# Patient Record
Sex: FEMALE | Race: WHITE | NOT HISPANIC OR LATINO
[De-identification: names, ages, dates, MRNs, and addresses within clinical notes are randomized per-mention and may not be internally consistent; named-entity substitution may affect disease eponyms.]

---

## 2017-08-30 ENCOUNTER — APPOINTMENT (OUTPATIENT)
Dept: ORTHOPEDIC SURGERY | Facility: CLINIC | Age: 62
End: 2017-08-30

## 2018-12-14 ENCOUNTER — OUTPATIENT (OUTPATIENT)
Dept: OUTPATIENT SERVICES | Facility: HOSPITAL | Age: 63
LOS: 1 days | End: 2018-12-14
Payer: COMMERCIAL

## 2018-12-14 ENCOUNTER — APPOINTMENT (OUTPATIENT)
Dept: CT IMAGING | Facility: CLINIC | Age: 63
End: 2018-12-14
Payer: COMMERCIAL

## 2018-12-14 DIAGNOSIS — Z00.8 ENCOUNTER FOR OTHER GENERAL EXAMINATION: ICD-10-CM

## 2018-12-14 PROCEDURE — 71260 CT THORAX DX C+: CPT | Mod: 26

## 2018-12-14 PROCEDURE — 74177 CT ABD & PELVIS W/CONTRAST: CPT | Mod: 26

## 2018-12-14 PROCEDURE — 70491 CT SOFT TISSUE NECK W/DYE: CPT | Mod: 26

## 2018-12-19 ENCOUNTER — RESULT REVIEW (OUTPATIENT)
Age: 63
End: 2018-12-19

## 2018-12-19 PROCEDURE — 88313 SPECIAL STAINS GROUP 2: CPT

## 2018-12-19 PROCEDURE — 88341 IMHCHEM/IMCYTCHM EA ADD ANTB: CPT

## 2018-12-19 PROCEDURE — 82565 ASSAY OF CREATININE: CPT

## 2018-12-19 PROCEDURE — 88342 IMHCHEM/IMCYTCHM 1ST ANTB: CPT | Mod: 26

## 2018-12-19 PROCEDURE — 88313 SPECIAL STAINS GROUP 2: CPT | Mod: 26

## 2018-12-19 PROCEDURE — 74177 CT ABD & PELVIS W/CONTRAST: CPT

## 2018-12-19 PROCEDURE — 88342 IMHCHEM/IMCYTCHM 1ST ANTB: CPT

## 2018-12-19 PROCEDURE — 88237 TISSUE CULTURE BONE MARROW: CPT

## 2018-12-19 PROCEDURE — 70491 CT SOFT TISSUE NECK W/DYE: CPT

## 2018-12-19 PROCEDURE — 88305 TISSUE EXAM BY PATHOLOGIST: CPT | Mod: 26

## 2018-12-19 PROCEDURE — 71260 CT THORAX DX C+: CPT

## 2018-12-19 PROCEDURE — 88184 FLOWCYTOMETRY/ TC 1 MARKER: CPT

## 2018-12-19 PROCEDURE — 88341 IMHCHEM/IMCYTCHM EA ADD ANTB: CPT | Mod: 26

## 2018-12-19 PROCEDURE — 88305 TISSUE EXAM BY PATHOLOGIST: CPT

## 2018-12-19 PROCEDURE — 88188 FLOWCYTOMETRY/READ 9-15: CPT | Mod: 59

## 2018-12-19 PROCEDURE — 88185 FLOWCYTOMETRY/TC ADD-ON: CPT

## 2018-12-19 PROCEDURE — 88264 CHROMOSOME ANALYSIS 20-25: CPT

## 2018-12-19 PROCEDURE — 85097 BONE MARROW INTERPRETATION: CPT

## 2018-12-19 PROCEDURE — 88280 CHROMOSOME KARYOTYPE STUDY: CPT

## 2018-12-19 PROCEDURE — 87205 SMEAR GRAM STAIN: CPT

## 2018-12-19 PROCEDURE — 88291 CYTO/MOLECULAR REPORT: CPT

## 2018-12-21 ENCOUNTER — OUTPATIENT (OUTPATIENT)
Dept: OUTPATIENT SERVICES | Facility: HOSPITAL | Age: 63
LOS: 1 days | End: 2018-12-21
Payer: COMMERCIAL

## 2018-12-21 ENCOUNTER — APPOINTMENT (OUTPATIENT)
Dept: NUCLEAR MEDICINE | Facility: CLINIC | Age: 63
End: 2018-12-21
Payer: COMMERCIAL

## 2018-12-21 DIAGNOSIS — Z00.8 ENCOUNTER FOR OTHER GENERAL EXAMINATION: ICD-10-CM

## 2018-12-21 PROCEDURE — A9552: CPT

## 2018-12-21 PROCEDURE — 78815 PET IMAGE W/CT SKULL-THIGH: CPT

## 2018-12-21 PROCEDURE — 78815 PET IMAGE W/CT SKULL-THIGH: CPT | Mod: 26,PI

## 2018-12-26 LAB
HEMATOPATHOLOGY REPORT: SIGNIFICANT CHANGE UP
TM INTERPRETATION: SIGNIFICANT CHANGE UP

## 2018-12-31 ENCOUNTER — RESULT REVIEW (OUTPATIENT)
Age: 63
End: 2018-12-31

## 2018-12-31 ENCOUNTER — OUTPATIENT (OUTPATIENT)
Dept: OUTPATIENT SERVICES | Facility: HOSPITAL | Age: 63
LOS: 1 days | End: 2018-12-31
Payer: COMMERCIAL

## 2018-12-31 DIAGNOSIS — C50.919 MALIGNANT NEOPLASM OF UNSPECIFIED SITE OF UNSPECIFIED FEMALE BREAST: ICD-10-CM

## 2018-12-31 PROCEDURE — 88321 CONSLTJ&REPRT SLD PREP ELSWR: CPT

## 2019-01-03 LAB — CHROM ANALY OVERALL INTERP SPEC-IMP: SIGNIFICANT CHANGE UP

## 2019-01-04 LAB — SURGICAL PATHOLOGY STUDY: SIGNIFICANT CHANGE UP

## 2021-03-02 ENCOUNTER — OUTPATIENT (OUTPATIENT)
Dept: OUTPATIENT SERVICES | Facility: HOSPITAL | Age: 66
LOS: 1 days | End: 2021-03-02
Payer: MEDICARE

## 2021-03-02 DIAGNOSIS — Z20.828 CONTACT WITH AND (SUSPECTED) EXPOSURE TO OTHER VIRAL COMMUNICABLE DISEASES: ICD-10-CM

## 2021-03-02 LAB — SARS-COV-2 RNA SPEC QL NAA+PROBE: SIGNIFICANT CHANGE UP

## 2021-03-02 PROCEDURE — C9803: CPT

## 2021-03-02 PROCEDURE — U0005: CPT

## 2021-03-02 PROCEDURE — U0003: CPT

## 2021-03-03 DIAGNOSIS — Z20.828 CONTACT WITH AND (SUSPECTED) EXPOSURE TO OTHER VIRAL COMMUNICABLE DISEASES: ICD-10-CM

## 2022-08-09 ENCOUNTER — APPOINTMENT (OUTPATIENT)
Dept: RHEUMATOLOGY | Facility: CLINIC | Age: 67
End: 2022-08-09

## 2022-09-08 ENCOUNTER — APPOINTMENT (OUTPATIENT)
Dept: ORTHOPEDIC SURGERY | Facility: CLINIC | Age: 67
End: 2022-09-08

## 2022-09-08 VITALS — HEIGHT: 68 IN | BODY MASS INDEX: 22.88 KG/M2 | WEIGHT: 151 LBS

## 2022-09-08 DIAGNOSIS — Z00.00 ENCOUNTER FOR GENERAL ADULT MEDICAL EXAMINATION W/OUT ABNORMAL FINDINGS: ICD-10-CM

## 2022-09-08 PROCEDURE — 99213 OFFICE O/P EST LOW 20 MIN: CPT | Mod: 57

## 2022-09-10 NOTE — HISTORY OF PRESENT ILLNESS
[5] : 5 [4] : 4 [Dull/Aching] : dull/aching [de-identified] : L worse than R CTS\par  [FreeTextEntry1] : hands

## 2022-09-10 NOTE — ASSESSMENT
[FreeTextEntry1] : For L CTR\par R/B/A of surgery discussed with the patient. Risks including but not limited to infection, nerve damage, tendon damage, pain, stiffness, recurrence, no resolution of symptoms, loss of function, limb or life. They understand and agree to surgery \par Return post op

## 2022-09-10 NOTE — PHYSICAL EXAM
[de-identified] : R hand: \par Tender volar wrist \par Good finger ROM \par +Tinels \par +Phalens \par +Compression test \par Decreased sensation median nerve distribution\par \par L hand: \par Tender volar wrist \par Good finger ROM \par +Tinels \par +Phalens \par +Compression test \par Decreased sensation median nerve distribution\par +thenar atrophy\par

## 2022-10-12 ENCOUNTER — APPOINTMENT (OUTPATIENT)
Age: 67
End: 2022-10-12

## 2022-10-12 PROCEDURE — 64721 CARPAL TUNNEL SURGERY: CPT | Mod: LT

## 2022-10-12 PROCEDURE — ZZZZZ: CPT

## 2022-10-20 ENCOUNTER — APPOINTMENT (OUTPATIENT)
Dept: PHYSICAL MEDICINE AND REHAB | Facility: CLINIC | Age: 67
End: 2022-10-20

## 2022-10-20 ENCOUNTER — APPOINTMENT (OUTPATIENT)
Dept: ORTHOPEDIC SURGERY | Facility: CLINIC | Age: 67
End: 2022-10-20

## 2022-10-20 VITALS
TEMPERATURE: 97.1 F | BODY MASS INDEX: 22.88 KG/M2 | RESPIRATION RATE: 17 BRPM | WEIGHT: 151 LBS | SYSTOLIC BLOOD PRESSURE: 128 MMHG | OXYGEN SATURATION: 97 % | HEART RATE: 82 BPM | HEIGHT: 68 IN | DIASTOLIC BLOOD PRESSURE: 82 MMHG

## 2022-10-20 VITALS — HEIGHT: 68 IN | WEIGHT: 151 LBS | BODY MASS INDEX: 22.88 KG/M2

## 2022-10-20 PROCEDURE — 99204 OFFICE O/P NEW MOD 45 MIN: CPT

## 2022-10-20 PROCEDURE — 99024 POSTOP FOLLOW-UP VISIT: CPT

## 2022-10-24 NOTE — PHYSICAL EXAM
[FreeTextEntry1] : Pleasant, in no distress. Language: English\par HEENT: Head: no trauma. Eyes: no discharge. Ears: No discharge. Nose No discharge. Throat: clear\par Neck: FAROM. Negative Spurlings\par Heart: RR, +S1, S2\par Lungs: CTA\par Abdomen: soft, NT\par Lumbar spine: FAROM, Tender over the left SI joint.\par \par LUE: Shoulder:AROM, 0-120.  Tender anterior aspect of the shoulder.  Positive impingement.  Negative drop arm.MS 4/5\par Elbow: FAROM, MS 5/5 reflexes 2/4\par Wrist: FAROM, MS 5/5 reflexes 2/4\par Warm, nontender, pulse 2+\par \par RUE:Shoulder:FAROM, MS 5/5\par Elbow: FAROM, MS 5/5 reflexes 2/4\par Wrist: FAROM, MS 5/5 reflexes 2/4\par Warm, nontender, pulse 2+\par \par LLE: Hip: FAROM, MS 5-/5 Tender over the lateral hip bursa.  Tender over the piriformis.\par Knee: FAROM, MS 5/5 reflexes 2/4\par Ankle: FAROM, MS 5/5 reflexes 2/4\par Warm , nontender, pulse 2+ negative homans\par \par RLE: Hip: FAROM, MS 5/5\par Knee: FAROM, MS 5/5 reflexes 2/4\par Ankle: FAROM, MS 5/5 reflexes 2/4\par Warm , nontender, pulse 2+ negative homans\par \par Gait: Spontaneous, reciprocal, safe without an assistive device\par \par Sensation\par RUE: sensation is intact to light touch, pinprick  and proprioception\par LUE: sensation is intact to light touch, pinprick  and proprioception\par RLE: sensation is intact to light touch, pinprick  and proprioception. Neg SLR. Neg TEJAS, Neg FADIR\par LLE: sensation is intact to light touch, pinprick  and proprioception. Neg SLR. Neg TEJAS, Neg FADIR\par \par

## 2022-10-24 NOTE — HISTORY OF PRESENT ILLNESS
[3] : 3 [1] : 2 [Dull/Aching] : dull/aching [] : Post Surgical Visit: yes [de-identified] : L CTR last week\par She is much better  [FreeTextEntry1] : left wrist  [de-identified] : 10/12/22 [de-identified] : Left CTR

## 2022-10-24 NOTE — HISTORY OF PRESENT ILLNESS
[FreeTextEntry1] : Chief complaint: Left hip and left shoulder pain.\par \par 67-year-old female developed left hip and left shoulder pain.\par \par left hip pain\par Pain:  8/10 Worse: 10/10 Quality: sharp  Frequency: constant\par The pain starts in the left buttock .  The pain radiates to the left lateral leg down to the knee.Pain is aggravated with sitting more than 1 hour.\par She tried to self treat with rest, Tylenol, Advil, use of her whirlpool.\par She went to a chiropractor who performed stretching, spine mobilization and cupping.\par \par Left shoulder pain\par She cannot recall an inciting event.\par Pain:  8/10 Worse: 10/10 Quality: sharp  Frequency: constant\par The pain starts in the left shoulder without radiation.  The pain is worse with use of her arm away from the body.  He cannot lay on the left shoulder at night.

## 2022-10-24 NOTE — PHYSICAL EXAM
[de-identified] : Mild hand swelling\par Healed incision\par No evidence of infection\par Mild tenderness at the surgical site\par Good finger ROM\par Sensation improved

## 2022-10-24 NOTE — REVIEW OF SYSTEMS
[Joint Pain] : joint pain [Joint Stiffness] : joint stiffness [Muscle Pain] : muscle pain [Fever] : no fever [Eye Pain] : no eye pain [Earache] : no earache [Chest Pain] : no chest pain [Shortness Of Breath] : no shortness of breath [Dysuria] : no dysuria [Skin Wound] : no skin wound [Dizziness] : no dizziness [Insomnia] : no insomnia [Easy Bruising] : no tendency for easy bruising

## 2022-10-28 ENCOUNTER — NON-APPOINTMENT (OUTPATIENT)
Age: 67
End: 2022-10-28

## 2022-10-28 RX ORDER — METHYLPREDNISOLONE 4 MG/1
4 TABLET ORAL
Qty: 1 | Refills: 0 | Status: ACTIVE | COMMUNITY
Start: 2022-10-28 | End: 1900-01-01

## 2022-11-04 ENCOUNTER — NON-APPOINTMENT (OUTPATIENT)
Age: 67
End: 2022-11-04

## 2022-11-07 ENCOUNTER — APPOINTMENT (OUTPATIENT)
Dept: PHYSICAL MEDICINE AND REHAB | Facility: CLINIC | Age: 67
End: 2022-11-07

## 2022-11-07 VITALS
WEIGHT: 150 LBS | RESPIRATION RATE: 17 BRPM | HEART RATE: 80 BPM | SYSTOLIC BLOOD PRESSURE: 118 MMHG | DIASTOLIC BLOOD PRESSURE: 74 MMHG | BODY MASS INDEX: 22.73 KG/M2 | TEMPERATURE: 97.4 F | HEIGHT: 68 IN | OXYGEN SATURATION: 98 %

## 2022-11-07 DIAGNOSIS — G57.02 LESION OF SCIATIC NERVE, LEFT LOWER LIMB: ICD-10-CM

## 2022-11-07 DIAGNOSIS — M25.512 PAIN IN LEFT SHOULDER: ICD-10-CM

## 2022-11-07 PROCEDURE — 20552 NJX 1/MLT TRIGGER POINT 1/2: CPT

## 2022-11-07 PROCEDURE — 99214 OFFICE O/P EST MOD 30 MIN: CPT | Mod: 25

## 2022-11-08 PROBLEM — G57.02 PIRIFORMIS SYNDROME OF LEFT SIDE: Status: ACTIVE | Noted: 2022-10-20

## 2022-11-08 PROBLEM — M25.512 LEFT SHOULDER PAIN: Status: ACTIVE | Noted: 2022-10-20

## 2022-11-08 RX ORDER — TRIAMCINOLONE ACETONIDE 40 MG/ML
40 SUSPENSION INTRA-ARTERIAL; INTRAMUSCULAR
Qty: 1 | Refills: 0 | Status: COMPLETED | OUTPATIENT
Start: 2022-11-08

## 2022-11-08 RX ORDER — LIDOCAINE HYDROCHLORIDE 20 MG/ML
2 INJECTION, SOLUTION INFILTRATION; PERINEURAL
Qty: 0 | Refills: 0 | Status: COMPLETED | OUTPATIENT
Start: 2022-11-08

## 2022-11-08 RX ADMIN — LIDOCAINE HYDROCHLORIDE 0.5 %: 20 INJECTION, SOLUTION EPIDURAL; INFILTRATION; INTRACAUDAL; PERINEURAL at 00:00

## 2022-11-08 RX ADMIN — TRIAMCINOLONE ACETONIDE 0 MG/ML: 40 SUSPENSION INTRA-ARTERIAL; INTRAMUSCULAR at 00:00

## 2022-11-08 NOTE — HISTORY OF PRESENT ILLNESS
[FreeTextEntry1] : Chief complaint: Left hip and left shoulder pain.\par \par 67-year-old female developed left hip and left shoulder pain.\par \par She has been having increased low back pain since her last office visit.\par In the interim I have placed her on a Medrol Dosepak with significant relief of her pain\par She continues to have pain and spasm in the low back that woke her from sleep.\par 3 days ago I placed her on cyclobenzaprine 5 mg 2 tabs p.o. nightly\par She also is allowed to take oxycodone 5 mg 1/2 tablets 1 tablet p.o. overnight for persistent severe pain\par Returns to the office today for persistent low back pain\par She attends restorative therapies 2 times a week with improvement of the left shoulder but not so much improvement in the left hip pain\par \par left hip pain\par Pain:  8/10 Worse: 10/10 Quality: sharp  Frequency: constant\par The pain is focused in the left upper quadrant of the buttock the pain radiates to the left lateral leg down to the knee.Pain is aggravated with sitting more than 1 hour.\par She tried to self treat with rest, Tylenol, Advil, use of her whirlpool.\par She went to a chiropractor who performed stretching, spine mobilization and cupping.\par \par Left shoulder pain\par She cannot recall an inciting event.\par Pain:  6/10 Worse: 10/10 Quality: sharp  Frequency: constant\par The pain starts in the left shoulder without radiation.  The pain is worse with use of her arm away from the body.  He cannot lay on the left shoulder at night.

## 2022-11-08 NOTE — DATA REVIEWED
[FreeTextEntry1] : X-ray of the left shoulder performed on October 21, 2022 reveals mild DJD of the AC joint\par \par X-ray of the lumbar spine performed on October 21, 2022 reveals no fracture.  He has mild degenerative spurring throughout the lumbar spine.  Grade 1 retrolisthesis at L4/5.  Disc narrowing at L2, L3/4 and L5/S1\par

## 2022-11-08 NOTE — REVIEW OF SYSTEMS
[Joint Pain] : joint pain [Joint Stiffness] : joint stiffness [Muscle Pain] : muscle pain [Fever] : no fever [Eye Pain] : no eye pain [Earache] : no earache [Chest Pain] : no chest pain [Shortness Of Breath] : no shortness of breath [Dysuria] : no dysuria [Skin Wound] : no skin wound [Dizziness] : no dizziness [Easy Bruising] : no tendency for easy bruising [Insomnia] : no insomnia

## 2022-11-08 NOTE — PHYSICAL EXAM
[FreeTextEntry1] : Pleasant, in no distress. Language: English\par HEENT: Head: no trauma. Eyes: no discharge. Ears: No discharge. Nose No discharge. Throat: clear\par Neck: FAROM. Negative Spurlings\par Heart: RR, +S1, S2\par Lungs: CTA\par Abdomen: soft, NT\par Lumbar spine: FAROM, Tender over the left SI joint.\par \par LUE: Shoulder:AROM, 0-120.  Tender anterior aspect of the shoulder.  Positive impingement.  Negative drop arm.MS 4/5\par Elbow: FAROM, MS 5/5 reflexes 2/4\par Wrist: FAROM, MS 5/5 reflexes 2/4\par Warm, nontender, pulse 2+\par \par RUE:Shoulder:FAROM, MS 5/5\par Elbow: FAROM, MS 5/5 reflexes 2/4\par Wrist: FAROM, MS 5/5 reflexes 2/4\par Warm, nontender, pulse 2+\par \par LLE: Hip: FAROM, MS 5-/5 less tender over the lateral hip bursa.  Tender over the piriformis./Upper quadrant of the gluteus medius muscle\par Knee: FAROM, MS 5/5 reflexes 2/4\par Ankle: FAROM, MS 5/5 reflexes 2/4\par Warm , nontender, pulse 2+ negative homans\par \par RLE: Hip: FAROM, MS 5/5\par Knee: FAROM, MS 5/5 reflexes 2/4\par Ankle: FAROM, MS 5/5 reflexes 2/4\par Warm , nontender, pulse 2+ negative homans\par \par Gait: Spontaneous, reciprocal, safe without an assistive device\par \par Sensation\par RUE: sensation is intact to light touch, pinprick  and proprioception\par LUE: sensation is intact to light touch, pinprick  and proprioception\par RLE: sensation is intact to light touch, pinprick  and proprioception. Neg SLR. Neg TEJAS, Neg FADIR\par LLE: sensation is intact to light touch, pinprick  and proprioception. Neg SLR. Neg TEJAS, Neg FADIR\par \par

## 2022-11-15 ENCOUNTER — RX RENEWAL (OUTPATIENT)
Age: 67
End: 2022-11-15

## 2022-11-16 RX ORDER — HYDROCODONE BITARTRATE AND ACETAMINOPHEN 5; 325 MG/1; MG/1
5-325 TABLET ORAL 4 TIMES DAILY
Qty: 28 | Refills: 0 | Status: ACTIVE | COMMUNITY
Start: 2022-11-09 | End: 1900-01-01

## 2022-11-16 RX ORDER — CYCLOBENZAPRINE HYDROCHLORIDE 5 MG/1
5 TABLET, FILM COATED ORAL 3 TIMES DAILY
Qty: 90 | Refills: 0 | Status: ACTIVE | COMMUNITY
Start: 2022-11-03 | End: 1900-01-01

## 2022-11-18 ENCOUNTER — APPOINTMENT (OUTPATIENT)
Dept: PHYSICAL MEDICINE AND REHAB | Facility: CLINIC | Age: 67
End: 2022-11-18

## 2022-11-18 VITALS — SYSTOLIC BLOOD PRESSURE: 111 MMHG | DIASTOLIC BLOOD PRESSURE: 74 MMHG | HEART RATE: 110 BPM | OXYGEN SATURATION: 100 %

## 2022-11-18 PROCEDURE — 99214 OFFICE O/P EST MOD 30 MIN: CPT

## 2022-11-18 RX ORDER — BIOTIN 10 MG
TABLET ORAL
Refills: 0 | Status: ACTIVE | COMMUNITY

## 2022-11-18 RX ORDER — HYDROXYCHLOROQUINE SULFATE 200 MG/1
200 TABLET, FILM COATED ORAL
Qty: 180 | Refills: 0 | Status: ACTIVE | COMMUNITY
Start: 2022-06-15

## 2022-11-18 RX ORDER — LORATADINE 10 MG/1
TABLET ORAL
Refills: 0 | Status: ACTIVE | COMMUNITY

## 2022-11-18 RX ORDER — NAPROXEN 500 MG/1
500 TABLET ORAL
Qty: 60 | Refills: 0 | Status: DISCONTINUED | COMMUNITY
Start: 2022-10-20 | End: 2022-11-18

## 2022-11-18 RX ORDER — HYDROCODONE BITARTRATE AND ACETAMINOPHEN 5; 325 MG/1; MG/1
5-325 TABLET ORAL EVERY 4 HOURS
Qty: 10 | Refills: 0 | Status: DISCONTINUED | COMMUNITY
Start: 2022-10-10 | End: 2022-11-18

## 2022-11-18 RX ORDER — LEFLUNOMIDE 20 MG/1
20 TABLET, FILM COATED ORAL
Qty: 90 | Refills: 0 | Status: ACTIVE | COMMUNITY
Start: 2022-08-29

## 2022-11-18 NOTE — DATA REVIEWED
[MRI] : MRI [FreeTextEntry1] : MRI lumbar spine done 11/10/22 showed evidence of left paracentral disc herniation at L3-L4 with left lateral recess and foraminal impingement, L4-L5 central disc herniation with annular tear and bilateral foraminal impingement, L5-S1 disc bulge with right S1 nerve impingment and left foraminal and left S1 impingement.

## 2022-11-18 NOTE — PHYSICAL EXAM
[Normal] : Normal gait, no clubbing or cyanosis of the fingernails, no joint swelling seen and muscle strength and tone normal [de-identified] : 4/5 left hip flexion

## 2022-11-18 NOTE — CONSULT LETTER
[Dear  ___] : Dear ~DEMARCO, [Consult Letter:] : I had the pleasure of evaluating your patient, [unfilled]. [Please see my note below.] : Please see my note below. [Consult Closing:] : Thank you very much for allowing me to participate in the care of this patient.  If you have any questions, please do not hesitate to contact me. [Sincerely,] : Sincerely, [FreeTextEntry2] : Jaziel Marie MD\par [address]\par [place] [address] [FreeTextEntry3] : [place]

## 2022-11-18 NOTE — HISTORY OF PRESENT ILLNESS
[Back] : back [___ wks] : [unfilled] week(s) ago [10] : a maximum pain level of 10/10 [Sharp] : sharp [Right] : right [Anterior] : anterior aspect of the [Feet] : feet [Numbness] : numbness [Weakness] : weakness [Laying] : laying [Walking] : walking [Insomnia] : insomnia [Gait Dysfunction] : gait dysfunction [PT] : PT [Acupuncture] : acupuncture [Medications] : medications [FreeTextEntry2] : numbness in the 3rd-5th toes of the left foot, weakness in both legs [FreeTextEntry6] : Hydrocodone, prednisone, Tylenol

## 2022-11-18 NOTE — ASSESSMENT
[FreeTextEntry1] : 67 year old female with lumbar radicular pain secondary to disc herniation.  Given the nature of the patient's complaints and lack of significant improvement following more conservative measures, we did discuss lumbar epidural steroid injection.  Risks, benefits, and expectations of the procedure were reviewed.  The patient was provided with an educational pamphlet outlining the details of the procedure so that he/she may have the ability to review the information prior to proceeding.  The patient has agreed to proceed and will follow up with me for the procedure.\par

## 2022-11-21 ENCOUNTER — OUTPATIENT (OUTPATIENT)
Dept: OUTPATIENT SERVICES | Facility: HOSPITAL | Age: 67
LOS: 1 days | End: 2022-11-21
Payer: MEDICARE

## 2022-11-21 ENCOUNTER — APPOINTMENT (OUTPATIENT)
Dept: PHYSICAL MEDICINE AND REHAB | Facility: CLINIC | Age: 67
End: 2022-11-21

## 2022-11-21 DIAGNOSIS — M54.16 RADICULOPATHY, LUMBAR REGION: ICD-10-CM

## 2022-11-21 LAB — SARS-COV-2 N GENE NPH QL NAA+PROBE: NOT DETECTED

## 2022-11-21 PROCEDURE — 62323 NJX INTERLAMINAR LMBR/SAC: CPT

## 2022-11-21 NOTE — PROCEDURE
[de-identified] : [facility] \par [facility]\par [address], [place], [address] - [phone]\par \par PATIENT: ALONZO KINNEY \par MEDICAL RECORD #:\par DATE OF OPERATION: 11/21/2022 \par PREOPERATIVE DIAGNOSIS:  LUMBAR RADICULAR PAIN\par POSTOPERATIVE DIAGNOSIS:  LUMBAR RADICULAR PAIN\par PROCEDURE: LUMBAR EPIDURAL STEROID INJECTION UNDER X-RAY GUIDANCE\par SURGEON:  BUCKY GUPTA M.D.\par ANESTHEISA:  LOCAL\par EBL:  MINIMAL\par \par INDICATIONS:  The patient returns to Pain Management with persistent lower back pain with radiation down the left leg.  The pain has remained quite significant and interferes with the patient’s ability to perform ADLs despite the implementation of other conservative measures.  I discussed with the patient at length the risks, benefits, and expectations of the aforementioned procedure.  All of the patient’s questions were answered.  The patient signed informed consent and agreed to proceed.\par \par PROCEDURE:  The patient was transported to the operating room, placed in the prone position and monitored non-invasively with stable vital signs and no complaints.  The patient’s back was prepped three times with betadine and draped in meticulous sterile fashion.  The L5-S1 interspace was identified fluoroscopically and the skin overlying this level was infiltrated with 5cc of 1% preservative-free lidocaine using a 25 gauge one inch needle.  The epidural space was approached and entered at this level with a 20 gauge Tuohy needle by loss of resistance technique.\par \par Following loss of resistance to air, aspiration was negative for CSF or heme.  Then, 2cc of Omnipaque 300 were injected and the epidurogram revealed spread from L5 to S1 in the posterior epidural space bilaterally with left-sided predominance and no distinct cutoff.  Depo-Medrol 80mg was then injected with 1cc of preservative-free 1% lidocaine.  The needle tract was flushed with 2cc of 1% lidocaine and the needle was removed.  A sterile bandage was applied.\par \par The patient tolerated the procedure well without complaint or complication.  The patient was observed in stable condition after the procedure for approximately 30 minutes before being discharged to home in the company of an adult.  The patient was provided with full written instructions.  The patient will be seen for follow-up in my office in 1 week but certainly sooner with any questions or concerns.\par \par \par \par Bucky Gupta M.D.\par \par \par

## 2022-11-22 DIAGNOSIS — M54.17 RADICULOPATHY, LUMBOSACRAL REGION: ICD-10-CM

## 2022-11-28 ENCOUNTER — RX RENEWAL (OUTPATIENT)
Age: 67
End: 2022-11-28

## 2022-11-28 RX ORDER — GABAPENTIN 100 MG/1
100 CAPSULE ORAL AT BEDTIME
Qty: 90 | Refills: 0 | Status: ACTIVE | COMMUNITY
Start: 2022-11-16 | End: 1900-01-01

## 2022-12-05 ENCOUNTER — APPOINTMENT (OUTPATIENT)
Dept: PHYSICAL MEDICINE AND REHAB | Facility: CLINIC | Age: 67
End: 2022-12-05

## 2022-12-06 ENCOUNTER — APPOINTMENT (OUTPATIENT)
Dept: PHYSICAL MEDICINE AND REHAB | Facility: CLINIC | Age: 67
End: 2022-12-06

## 2022-12-06 VITALS
SYSTOLIC BLOOD PRESSURE: 111 MMHG | OXYGEN SATURATION: 100 % | HEART RATE: 89 BPM | DIASTOLIC BLOOD PRESSURE: 76 MMHG | TEMPERATURE: 97.88 F

## 2022-12-06 PROCEDURE — 99214 OFFICE O/P EST MOD 30 MIN: CPT

## 2022-12-06 NOTE — ASSESSMENT
[FreeTextEntry1] : 67 year old female with low back and lumbar radicular pain only temporarily improved following her first LESI.  She will follow up with me next week for her procedure.

## 2022-12-06 NOTE — HISTORY OF PRESENT ILLNESS
[FreeTextEntry1] : Patient returns after the first LESI a few weeks ago.  The patient reports significant improvement in the symptoms about 100% improved but for only 1-2 days.  The patient returns for a follow up visit today.\par

## 2022-12-07 ENCOUNTER — APPOINTMENT (OUTPATIENT)
Dept: PHYSICAL MEDICINE AND REHAB | Facility: CLINIC | Age: 67
End: 2022-12-07

## 2022-12-12 ENCOUNTER — APPOINTMENT (OUTPATIENT)
Dept: PHYSICAL MEDICINE AND REHAB | Facility: CLINIC | Age: 67
End: 2022-12-12

## 2022-12-12 ENCOUNTER — OUTPATIENT (OUTPATIENT)
Dept: OUTPATIENT SERVICES | Facility: HOSPITAL | Age: 67
LOS: 1 days | End: 2022-12-12
Payer: MEDICARE

## 2022-12-12 DIAGNOSIS — M54.16 RADICULOPATHY, LUMBAR REGION: ICD-10-CM

## 2022-12-12 LAB — SARS-COV-2 N GENE NPH QL NAA+PROBE: NOT DETECTED

## 2022-12-12 PROCEDURE — 62323 NJX INTERLAMINAR LMBR/SAC: CPT

## 2022-12-12 NOTE — PROCEDURE
[de-identified] : [facility] \par [facility]\par [address], [place], [address] - [phone]\par \par PATIENT: ALONZO KINNEY \par MEDICAL RECORD #:\par DATE OF OPERATION: 12/12/2022 \par PREOPERATIVE DIAGNOSIS:  LUMBAR RADICULAR PAIN\par POSTOPERATIVE DIAGNOSIS:  LUMBAR RADICULAR PAIN\par PROCEDURE: LUMBAR EPIDURAL STEROID INJECTION UNDER X-RAY GUIDANCE\par SURGEON:  BUCKY GUPTA M.D.\par ANESTHEISA:  LOCAL\par EBL:  MINIMAL\par \par INDICATIONS:  The patient returns to Pain Management with persistent lower back pain with radiation down the left leg.  The pain has remained quite significant and interferes with the patient’s ability to perform ADLs despite the implementation of other conservative measures.  I discussed with the patient at length the risks, benefits, and expectations of the aforementioned procedure.  All of the patient’s questions were answered.  The patient signed informed consent and agreed to proceed.\par \par PROCEDURE:  The patient was transported to the operating room, placed in the prone position and monitored non-invasively with stable vital signs and no complaints.  The patient’s back was prepped three times with betadine and draped in meticulous sterile fashion.  The L5-S1 interspace was identified fluoroscopically and the skin overlying this level was infiltrated with 5cc of 1% preservative-free lidocaine using a 25 gauge one inch needle.  The epidural space was approached and entered at this level with a 20 gauge Tuohy needle by loss of resistance technique.\par \par Following loss of resistance to air, aspiration was negative for CSF or heme.  Then, 2cc of Omnipaque 300 were injected and the epidurogram revealed spread from L5 to S1 in the posterior epidural space bilaterally with left-sided predominance and no distinct cutoff.  Depo-Medrol 80mg was then injected with 1cc of preservative-free 1% lidocaine.  The needle tract was flushed with 2cc of 1% lidocaine and the needle was removed.  A sterile bandage was applied.\par \par The patient tolerated the procedure well without complaint or complication.  The patient was observed in stable condition after the procedure for approximately 30 minutes before being discharged to home in the company of an adult.  The patient was provided with full written instructions.  The patient will be seen for follow-up in my office in 1 week but certainly sooner with any questions or concerns.\par \par \par \par Bucky Gupta M.D.\par \par \par

## 2023-01-03 ENCOUNTER — NON-APPOINTMENT (OUTPATIENT)
Age: 68
End: 2023-01-03

## 2023-01-06 ENCOUNTER — APPOINTMENT (OUTPATIENT)
Dept: PHYSICAL MEDICINE AND REHAB | Facility: CLINIC | Age: 68
End: 2023-01-06

## 2023-01-09 ENCOUNTER — APPOINTMENT (OUTPATIENT)
Dept: PHYSICAL MEDICINE AND REHAB | Facility: CLINIC | Age: 68
End: 2023-01-09
Payer: MEDICARE

## 2023-01-09 ENCOUNTER — OUTPATIENT (OUTPATIENT)
Dept: OUTPATIENT SERVICES | Facility: HOSPITAL | Age: 68
LOS: 1 days | End: 2023-01-09
Payer: MEDICARE

## 2023-01-09 DIAGNOSIS — M54.16 RADICULOPATHY, LUMBAR REGION: ICD-10-CM

## 2023-01-09 PROCEDURE — 62323 NJX INTERLAMINAR LMBR/SAC: CPT

## 2023-01-09 NOTE — PROCEDURE
[de-identified] : [facility] \par PA[facility]\par [address], [place], [address] - [phone]\par \par PATIENT: ALONZO KINNEY \par MEDICAL RECORD #:\par DATE OF OPERATION: 01/09/2023 \par PREOPERATIVE DIAGNOSIS:  LUMBAR RADICULAR PAIN\par POSTOPERATIVE DIAGNOSIS:  LUMBAR RADICULAR PAIN\par PROCEDURE: LUMBAR EPIDURAL STEROID INJECTION UNDER X-RAY GUIDANCE\par SURGEON:  BUCKY GUPTA M.D.\par ANESTHEISA:  LOCAL\par EBL:  MINIMAL\par \par INDICATIONS:  The patient returns to Pain Management with persistent lower back pain with radiation down the left leg.  The pain has remained quite significant and interferes with the patient’s ability to perform ADLs despite the implementation of other conservative measures.  I discussed with the patient at length the risks, benefits, and expectations of the aforementioned procedure.  All of the patient’s questions were answered.  The patient signed informed consent and agreed to proceed.\par \par PROCEDURE:  The patient was transported to the operating room, placed in the prone position and monitored non-invasively with stable vital signs and no complaints.  The patient’s back was prepped three times with betadine and draped in meticulous sterile fashion.  The L5-S1 interspace was identified fluoroscopically and the skin overlying this level was infiltrated with 5cc of 1% preservative-free lidocaine using a 25 gauge one inch needle.  The epidural space was approached and entered at this level with a 20 gauge Tuohy needle by loss of resistance technique.\par \par Following loss of resistance to air, aspiration was negative for CSF or heme.  Then, 2cc of Omnipaque 300 were injected and the epidurogram revealed spread from L5 to S1 in the posterior epidural space bilaterally with left-sided predominance and no distinct cutoff.  Depo-Medrol 80mg was then injected with 1cc of preservative-free 1% lidocaine.  The needle tract was flushed with 2cc of 1% lidocaine and the needle was removed.  A sterile bandage was applied.\par \par The patient tolerated the procedure well without complaint or complication.  The patient was observed in stable condition after the procedure for approximately 30 minutes before being discharged to home in the company of an adult.  The patient was provided with full written instructions.  The patient will be seen for follow-up in my office in 1 week but certainly sooner with any questions or concerns.\par \par \par \par Bucky Gupta M.D.\par \par \par

## 2023-02-01 ENCOUNTER — APPOINTMENT (OUTPATIENT)
Dept: PHYSICAL MEDICINE AND REHAB | Facility: CLINIC | Age: 68
End: 2023-02-01
Payer: MEDICARE

## 2023-02-01 VITALS
DIASTOLIC BLOOD PRESSURE: 75 MMHG | TEMPERATURE: 95.4 F | OXYGEN SATURATION: 100 % | SYSTOLIC BLOOD PRESSURE: 115 MMHG | HEART RATE: 82 BPM

## 2023-02-01 DIAGNOSIS — M51.36 OTHER INTERVERTEBRAL DISC DEGENERATION, LUMBAR REGION: ICD-10-CM

## 2023-02-01 DIAGNOSIS — M54.50 LOW BACK PAIN, UNSPECIFIED: ICD-10-CM

## 2023-02-01 PROCEDURE — 99213 OFFICE O/P EST LOW 20 MIN: CPT

## 2023-02-02 ENCOUNTER — NON-APPOINTMENT (OUTPATIENT)
Age: 68
End: 2023-02-02

## 2023-02-13 ENCOUNTER — NON-APPOINTMENT (OUTPATIENT)
Age: 68
End: 2023-02-13

## 2023-02-13 ENCOUNTER — APPOINTMENT (OUTPATIENT)
Dept: NEUROSURGERY | Facility: CLINIC | Age: 68
End: 2023-02-13
Payer: MEDICARE

## 2023-02-13 DIAGNOSIS — M51.26 OTHER INTERVERTEBRAL DISC DISPLACEMENT, LUMBAR REGION: ICD-10-CM

## 2023-02-13 DIAGNOSIS — Z85.79 PERSONAL HISTORY OF OTHER MALIGNANT NEOPLASMS OF LYMPHOID, HEMATOPOIETIC AND RELATED TISSUES: ICD-10-CM

## 2023-02-13 DIAGNOSIS — R20.0 ANESTHESIA OF SKIN: ICD-10-CM

## 2023-02-13 DIAGNOSIS — R29.898 OTHER SYMPTOMS AND SIGNS INVOLVING THE MUSCULOSKELETAL SYSTEM: ICD-10-CM

## 2023-02-13 DIAGNOSIS — Z87.39 PERSONAL HISTORY OF OTHER DISEASES OF THE MUSCULOSKELETAL SYSTEM AND CONNECTIVE TISSUE: ICD-10-CM

## 2023-02-13 PROCEDURE — 99203 OFFICE O/P NEW LOW 30 MIN: CPT

## 2023-02-13 RX ORDER — SULFASALAZINE 500 MG/1
500 TABLET ORAL
Refills: 0 | Status: ACTIVE | COMMUNITY

## 2023-02-14 NOTE — HISTORY OF PRESENT ILLNESS
[FreeTextEntry1] : Lowerback pain into left leg [de-identified] : Ms. ALONZO KINNEY is a 67 year presenting with a PMHx of RA 1 year   who presents for comprehensive neurosurgical evaluation of Lumbar Spine.\par She reports Lowerback pain with Left leg initially at night. The pain was interfering with her quality of life. PT  and gabapentin did not help. She was referred to see Dr. Gupta.\par She had JORGE 11/9/22 with relief for one day. She underwent a  series of JORGE injections.  Her second JORGE 2 helped for one wee and her third  JORGE on 1/9/23 change her pain syndrome and weakness in Left foot. She reports that [place] way while on vacation she began to experience a new kind of pressure behind her knee and painful tingling in her lower leg and foot.  She is concerned about the progression of her symptoms after receiving JORGE injections and is her for evaluation. SHe ambulates with slight limp.\par \par \par

## 2023-02-14 NOTE — ASSESSMENT
[FreeTextEntry1] : 67 year old female with low back and lumbar radicular pain.  We will give her a prescription for PT and she will continue with acupuncture.  She will also be consulting with Dr. Ruiz to discuss surgical options.

## 2023-02-14 NOTE — PHYSICAL EXAM
[General Appearance - Alert] : alert [General Appearance - In No Acute Distress] : in no acute distress [Oriented To Time, Place, And Person] : oriented to person, place, and time [No Visual Abnormalities] : no visible abnormailities [Straight-Leg Raise Test - Left] : straight leg raise of the left leg was negative [Straight-Leg Raise Test - Right] : straight leg raise  of the right leg was negative [Normal] : normal [Able to toe walk] : the patient was able to toe walk [Able to heel walk] : the patient was not able to heel walk [Outer Ear] : the ears and nose were normal in appearance [Neck Appearance] : the appearance of the neck was normal [Heart Rate And Rhythm] : heart rate was normal and rhythm regular [Abnormal Walk] : normal gait

## 2023-02-14 NOTE — HISTORY OF PRESENT ILLNESS
[FreeTextEntry1] : Patient returns after her third LESI.  Unfortunately, she does continue to have pain radiating down her left lateral calf and into her foot/toes.  She also has weakness in her left leg.  She has also been going to acupuncture which seems to be helping.  She is also considering surgical consultation.

## 2023-02-14 NOTE — REVIEW OF SYSTEMS
[Feeling Poorly] : feeling poorly [Feeling Tired] : not feeling tired [Leg Weakness] : leg weakness [Poor Coordination] : good coordination [Numbness] : numbness [Tingling] : tingling [Abnormal Sensation] : an abnormal sensation [Difficulty Walking] : difficulty walking [Arthralgias] : arthralgias [Joint Pain] : joint pain [Limb Pain] : limb pain [Negative] : Gastrointestinal

## 2023-02-14 NOTE — ASSESSMENT
[FreeTextEntry1] : Ms. ALONZO KINNEY is presenting with L5 Nerve Irritation/ Radiculopathy\par Dr. Ruiz explained in great detail a diagnosis of L5 Nerve Root irritation\par The recommendation is for the following:\par Begin Physical Therapy to strengthen Left Leg and foot weakness\par New MRI Lumbar Spine w/wo contrast to assess for L5 disc herniation\par Follow up after MRI is completed.\par \par \par Please see Dr. Ruiz's dictation for details.\par I, Dr. Jessica Ruiz evaluated the patient with the nurse practitioner Michele Barry and established the plan of care. I personally discuss this patient with the nurse practitioner at the time of the visit. I agree with the assessment and plan as written, unless noted below.\par \par \par

## 2023-02-23 ENCOUNTER — APPOINTMENT (OUTPATIENT)
Dept: MRI IMAGING | Facility: CLINIC | Age: 68
End: 2023-02-23
Payer: MEDICARE

## 2023-02-23 PROCEDURE — A9585: CPT

## 2023-02-23 PROCEDURE — 72158 MRI LUMBAR SPINE W/O & W/DYE: CPT

## 2023-02-28 ENCOUNTER — NON-APPOINTMENT (OUTPATIENT)
Age: 68
End: 2023-02-28

## 2023-03-06 ENCOUNTER — APPOINTMENT (OUTPATIENT)
Dept: NEUROSURGERY | Facility: CLINIC | Age: 68
End: 2023-03-06
Payer: MEDICARE

## 2023-03-06 DIAGNOSIS — M51.26 OTHER INTERVERTEBRAL DISC DISPLACEMENT, LUMBAR REGION: ICD-10-CM

## 2023-03-06 PROCEDURE — 99211 OFF/OP EST MAY X REQ PHY/QHP: CPT

## 2023-03-07 PROBLEM — M51.26 HERNIATED LUMBAR INTERVERTEBRAL DISC: Status: ACTIVE | Noted: 2023-02-13

## 2023-03-07 NOTE — ASSESSMENT
[FreeTextEntry1] : Ms. ALONZO KINNEY is presenting with Lumbar Radiculopathy\par Dr. Ruiz explained in great detail a diagnosis of Lumbar Radiculopathy\par The recommendation is for the following:\par The following explanation per Dr. Ruiz\par The L3-L4 is resolved. L5-S1 level remains unchanged from prior MRI. The herniated disc is more lateral. The majority of her symptoms is from the S1 lateral disc herniation. She may or may not benefit from a Transforaminal L5-S1 block. She will follow up with Dr. Ruiz to discuss further.\par \par Please see Dr. Ruiz's dictation for details.\par I, Dr. Jessica Ruiz evaluated the patient with the nurse practitioner Michele Barry and established the plan of care. I personally discuss this patient with the nurse practitioner at the time of the visit. I agree with the assessment and plan as written, unless noted below.\par \par

## 2023-03-07 NOTE — REASON FOR VISIT
[Follow-Up: _____] : a [unfilled] follow-up visit [FreeTextEntry1] : Today she comes with new MRI for review\par Today she reports the following:\par She reports that her symptoms are unchanged

## 2023-03-07 NOTE — DATA REVIEWED
[de-identified] : LUMBAR SPINE 2/23/23 IMPRESSION:\par 1. Multilevel degenerative disc disease is again seen with resolution of the prior inferior left paracentral disc extrusion at L3-L4.\par 2. L3-L4 demonstrates mild spinal canal stenosis with moderate-to-severe narrowing of the right lateral recess and moderate narrowing of the left lateral recess.\par 3. L4-L5 demonstrates mild to moderate spinal canal stenosis and mild bilateral neural foraminal stenosis.\par 4. L5-S1 left sided paravertebral osteophytes abut the extraforaminal left L5 nerve roots.\par

## 2023-03-07 NOTE — PHYSICAL EXAM
[General Appearance - Alert] : alert [General Appearance - In No Acute Distress] : in no acute distress [Oriented To Time, Place, And Person] : oriented to person, place, and time [] : no respiratory distress [Heart Rate And Rhythm] : heart rate was normal and rhythm regular [Abnormal Walk] : normal gait [Skin Color & Pigmentation] : normal skin color and pigmentation

## 2023-03-15 ENCOUNTER — APPOINTMENT (OUTPATIENT)
Dept: NEUROSURGERY | Facility: CLINIC | Age: 68
End: 2023-03-15

## 2023-05-26 ENCOUNTER — NON-APPOINTMENT (OUTPATIENT)
Age: 68
End: 2023-05-26

## 2023-06-28 ENCOUNTER — NON-APPOINTMENT (OUTPATIENT)
Age: 68
End: 2023-06-28

## 2023-07-10 ENCOUNTER — APPOINTMENT (OUTPATIENT)
Dept: ORTHOPEDIC SURGERY | Facility: CLINIC | Age: 68
End: 2023-07-10
Payer: MEDICARE

## 2023-07-10 VITALS — HEIGHT: 68 IN | WEIGHT: 150 LBS | BODY MASS INDEX: 22.73 KG/M2

## 2023-07-10 PROCEDURE — 99213 OFFICE O/P EST LOW 20 MIN: CPT

## 2023-07-10 NOTE — PHYSICAL EXAM
[de-identified] : R hand: \par Tender volar wrist \par Good finger ROM \par +Tinels \par +Phalens \par +Compression test \par Decreased sensation median nerve distribution\par

## 2023-07-10 NOTE — HISTORY OF PRESENT ILLNESS
[3] : 3 [Dull/Aching] : dull/aching [Radiating] : radiating [Tingling] : tingling [Intermittent] : intermittent [Meds] : meds [Full time] : Work status: full time [de-identified] : She has tightness in the R hand \par Pain at the dorsal hand at night \par \par She takes steroids which help\par \par Had L CTR [] : Post Surgical Visit: no [FreeTextEntry1] : Rt hand [FreeTextEntry5] : Patient is here with pain in her right hand, complains of tightness and numbness in the wrist for more than a year, has been seeing a rheumatologist in the past. had same issue with the left hand last year. [FreeTextEntry6] : numbness [de-identified] : n

## 2023-07-10 NOTE — ASSESSMENT
[FreeTextEntry1] : I rec CT injection- she declines\par Will get EMG\par Return after EMG- consider CTR

## 2023-07-11 ENCOUNTER — APPOINTMENT (OUTPATIENT)
Dept: NEUROLOGY | Facility: CLINIC | Age: 68
End: 2023-07-11
Payer: MEDICARE

## 2023-07-11 DIAGNOSIS — M54.12 RADICULOPATHY, CERVICAL REGION: ICD-10-CM

## 2023-07-11 DIAGNOSIS — R20.2 ANESTHESIA OF SKIN: ICD-10-CM

## 2023-07-11 DIAGNOSIS — R20.0 ANESTHESIA OF SKIN: ICD-10-CM

## 2023-07-11 PROCEDURE — 95886 MUSC TEST DONE W/N TEST COMP: CPT

## 2023-07-11 PROCEDURE — 95911 NRV CNDJ TEST 9-10 STUDIES: CPT

## 2023-07-13 ENCOUNTER — APPOINTMENT (OUTPATIENT)
Dept: ORTHOPEDIC SURGERY | Facility: CLINIC | Age: 68
End: 2023-07-13
Payer: MEDICARE

## 2023-07-13 VITALS — BODY MASS INDEX: 22.73 KG/M2 | HEIGHT: 68 IN | WEIGHT: 150 LBS

## 2023-07-13 PROCEDURE — 99213 OFFICE O/P EST LOW 20 MIN: CPT | Mod: 57

## 2023-07-13 NOTE — PHYSICAL EXAM
[de-identified] : R hand: \par Tender volar wrist \par Good finger ROM \par +Tinels \par +Phalens \par +Compression test \par Decreased sensation median nerve distribution\par

## 2023-07-13 NOTE — HISTORY OF PRESENT ILLNESS
[6] : 6 [Tingling] : tingling [de-identified] : EMG shows mod R CTR; cerv radic\par  [FreeTextEntry1] : R wrist [de-identified] : brace

## 2023-07-13 NOTE — ASSESSMENT
[FreeTextEntry1] : R CTR\par R/B/A of surgery discussed with the patient. Risks including but not limited to infection, nerve damage, tendon damage, pain, stiffness, recurrence, no resolution of symptoms, loss of function, limb or life. They understand and agree to surgery \par Return post op

## 2023-07-25 ENCOUNTER — APPOINTMENT (OUTPATIENT)
Dept: NEUROLOGY | Facility: CLINIC | Age: 68
End: 2023-07-25
Payer: MEDICARE

## 2023-07-25 VITALS
BODY MASS INDEX: 20.23 KG/M2 | TEMPERATURE: 98 F | HEIGHT: 68 IN | SYSTOLIC BLOOD PRESSURE: 116 MMHG | DIASTOLIC BLOOD PRESSURE: 71 MMHG | OXYGEN SATURATION: 100 % | RESPIRATION RATE: 15 BRPM | WEIGHT: 133.5 LBS | HEART RATE: 72 BPM

## 2023-07-25 DIAGNOSIS — R20.2 ANESTHESIA OF SKIN: ICD-10-CM

## 2023-07-25 DIAGNOSIS — R20.0 ANESTHESIA OF SKIN: ICD-10-CM

## 2023-07-25 PROCEDURE — 99204 OFFICE O/P NEW MOD 45 MIN: CPT

## 2023-07-27 LAB
ESTIMATED AVERAGE GLUCOSE: 100 MG/DL
FOLATE SERPL-MCNC: 14.3 NG/ML
HBA1C MFR BLD HPLC: 5.1 %
HCYS SERPL-MCNC: 10.5 UMOL/L
PCA AB SER QL IF: NORMAL
VIT B12 SERPL-MCNC: 402 PG/ML

## 2023-07-31 LAB
IF BLOCK AB SER QL: 1.2 AU/ML
METHYLMALONATE SERPL-SCNC: 224 NMOL/L

## 2023-07-31 RX ORDER — HYDROCODONE BITARTRATE AND ACETAMINOPHEN 5; 325 MG/1; MG/1
5-325 TABLET ORAL EVERY 4 HOURS
Qty: 10 | Refills: 0 | Status: ACTIVE | COMMUNITY
Start: 2023-07-31 | End: 1900-01-01

## 2023-08-02 ENCOUNTER — APPOINTMENT (OUTPATIENT)
Age: 68
End: 2023-08-02
Payer: MEDICARE

## 2023-08-02 PROCEDURE — 64721 CARPAL TUNNEL SURGERY: CPT | Mod: RT

## 2023-08-02 PROCEDURE — ZZZZZ: CPT

## 2023-08-04 RX ORDER — HYDROCODONE BITARTRATE AND ACETAMINOPHEN 5; 325 MG/1; MG/1
5-325 TABLET ORAL
Qty: 10 | Refills: 0 | Status: ACTIVE | COMMUNITY
Start: 2023-08-04 | End: 1900-01-01

## 2023-08-07 LAB — VIT B6 SERPL-MCNC: 19.9 UG/L

## 2023-08-08 NOTE — ASSESSMENT
[FreeTextEntry1] : patient  presents today with complaints of b/l   numbness and tingling to b/l E that has been more persistent past 6 months. initally noted after braking her left 5th toe 2021, gradually progressed to right foot.

## 2023-08-08 NOTE — HISTORY OF PRESENT ILLNESS
[FreeTextEntry1] : 68 year old female with PMhx of NHL 2019 s/p Rituxan , RA , Chronic LBP   Presents today for numbness and tingling in feet that started  s/p braking her  her left pinky toe foot in 3/2021.   9 2022 developed same numbness on her right foot, foot has been bothersome  past 6 months..  described and burning sensation, numbness. worse at rest and at night.  feels its in a vice   Saw Dr. Ruiz, MRI reported multilevel  degenerative jose disease, mild-mod stenosis, L5-S1 left sided paravertebral osteophytes abut the extraforaminal left L5 nerve roots - referred to PT; completed with  improvement of  radicular sx,   also LBP in September.  completed PT 11/2022-1/23 and epidurals She has been on Gabapentin and steroids since    hand tingling  started 12/21 saw amena hand, Received steroids injections with> > RA   5/2023- had rituacxin again for hand pains with no improvement  6/2023- told not to have RA  orhto hand told to have CTS 8/2022, s/p repair

## 2023-08-08 NOTE — PHYSICAL EXAM
[Affect] : the affect was normal [Mood] : the mood was normal [Person] : oriented to person [Place] : oriented to place [Time] : oriented to time [Current Events] : adequate knowledge of current events [Cranial Nerves Optic (II)] : visual acuity intact bilaterally,  visual fields full to confrontation, pupils equal round and reactive to light [Cranial Nerves Oculomotor (III)] : extraocular motion intact [Cranial Nerves Trigeminal (V)] : facial sensation intact symmetrically [Cranial Nerves Facial (VII)] : face symmetrical [Cranial Nerves Vestibulocochlear (VIII)] : hearing was intact bilaterally [Cranial Nerves Glossopharyngeal (IX)] : tongue and palate midline [Cranial Nerves Accessory (XI - Cranial And Spinal)] : head turning and shoulder shrug symmetric [Cranial Nerves Hypoglossal (XII)] : there was no tongue deviation with protrusion [Motor Strength] : muscle strength was normal in all four extremities [Motor Handedness Right-Handed] : the patient is right hand dominant [Proprioception] : proprioception was intact [Vibration Decrease Leg / Foot Toes Both Feet] : decreased at the toes of both feet  [Abnormal Walk] : normal gait [2+] : Patella left 2+ [1+] : Ankle jerk left 1+ [PERRL With Normal Accommodation] : pupils were equal in size, round, reactive to light, with normal accommodation [Extraocular Movements] : extraocular movements were intact [] : no respiratory distress [Edema] : there was no peripheral edema [General Appearance - In No Acute Distress] : in no acute distress [FreeTextEntry8] : minimal sway  [No Spinal Tenderness] : no spinal tenderness [Involuntary Movements] : no involuntary movements were seen

## 2023-08-10 ENCOUNTER — APPOINTMENT (OUTPATIENT)
Dept: ORTHOPEDIC SURGERY | Facility: CLINIC | Age: 68
End: 2023-08-10
Payer: MEDICARE

## 2023-08-10 VITALS — WEIGHT: 133 LBS | HEIGHT: 68 IN | BODY MASS INDEX: 20.16 KG/M2

## 2023-08-10 PROCEDURE — 99024 POSTOP FOLLOW-UP VISIT: CPT

## 2023-08-10 NOTE — PHYSICAL EXAM
[de-identified] : Mild hand swelling Healed incision No evidence of infection Mild tenderness at the surgical site Good finger ROM Sensaiton improved

## 2023-08-10 NOTE — HISTORY OF PRESENT ILLNESS
[1] : 2 [0] : 0 [Dull/Aching] : dull/aching [] : Post Surgical Visit: yes [de-identified] : R CTR last week She is feeling better  L hand swelling, pain from IV [FreeTextEntry1] : R wrist [de-identified] : 8/2/23 [de-identified] : R LEYLA

## 2023-08-10 NOTE — ASSESSMENT
[FreeTextEntry1] : Sutures removed Steris applied LIght activities and advance as tolerated Return prn

## 2023-08-17 ENCOUNTER — APPOINTMENT (OUTPATIENT)
Dept: ORTHOPEDIC SURGERY | Facility: CLINIC | Age: 68
End: 2023-08-17
Payer: MEDICARE

## 2023-08-17 VITALS — HEIGHT: 68 IN | WEIGHT: 133 LBS | BODY MASS INDEX: 20.16 KG/M2

## 2023-08-17 PROCEDURE — 73110 X-RAY EXAM OF WRIST: CPT | Mod: 50

## 2023-08-17 PROCEDURE — 99024 POSTOP FOLLOW-UP VISIT: CPT

## 2023-08-17 NOTE — HISTORY OF PRESENT ILLNESS
[6] : 6 [Dull/Aching] : dull/aching [] : Post Surgical Visit: yes [de-identified] : L wrist swelling Pain  Had R CTR- better  [FreeTextEntry1] : wrists [de-identified] : ice [de-identified] : 8/2/23 [de-identified] : R LEYLA

## 2023-08-17 NOTE — PHYSICAL EXAM
[de-identified] : Bilateral vilar distal forearm swelling Stiffness +Iproved sensaiton   Xrays OA

## 2023-08-23 ENCOUNTER — APPOINTMENT (OUTPATIENT)
Dept: NEUROLOGY | Facility: CLINIC | Age: 68
End: 2023-08-23
Payer: MEDICARE

## 2023-08-23 VITALS
SYSTOLIC BLOOD PRESSURE: 113 MMHG | HEART RATE: 66 BPM | HEIGHT: 68 IN | WEIGHT: 135 LBS | BODY MASS INDEX: 20.46 KG/M2 | DIASTOLIC BLOOD PRESSURE: 67 MMHG

## 2023-08-23 DIAGNOSIS — R20.2 ANESTHESIA OF SKIN: ICD-10-CM

## 2023-08-23 DIAGNOSIS — R20.0 ANESTHESIA OF SKIN: ICD-10-CM

## 2023-08-23 PROCEDURE — 99204 OFFICE O/P NEW MOD 45 MIN: CPT

## 2023-08-23 NOTE — CONSULT LETTER
[Dear  ___] : Dear  [unfilled], [Consult Letter:] : I had the pleasure of evaluating your patient, [unfilled]. [Please see my note below.] : Please see my note below. [Consult Closing:] : Thank you very much for allowing me to participate in the care of this patient.  If you have any questions, please do not hesitate to contact me. [Sincerely,] : Sincerely, [DrCaroline  ___] : Dr. HARTLEY [FreeTextEntry3] : Ankur Mejia MD

## 2023-08-23 NOTE — HISTORY OF PRESENT ILLNESS
[FreeTextEntry1] : Mrs. Fiordaliza Marte is a 68-year-old right-handed patient who was referred for neurologic evaluation by nurse practitioner Sharon Davis.  Mrs. Marte was diagnosed with follicular non-Hodgkin's lymphoma in 2018 involving her neck, groin and axillas.  In March 2019, she was treated with rituximab.  She is under the care of Dr. Geremias Caballero at [facility].  In November 2021, she experienced pins-and-needles and cramping of her right hand.  This was particularly prominent at night.  She noticed numbness of her first 3 fingers.  A few months later, her left hand became involved but more severely.  Her hands felt weak.  She was evaluated by a rheumatologist Dr. Donny Salas in [place].  She received another treatment of rituximab in March 2023 because of suspicion of rheumatoid arthritis.  Her symptoms persisted,  She underwent a left carpal tunnel release in October 2022 with relief of hand symptoms.  2 weeks ago, she underwent a right carpal tunnel release with improvement.  In November 2022, she experienced left buttock and low back pain.  She was treated with 3 epidural steroid injections.  After the last treatment, she experienced tingling of her left lateral calf and foot.  She subsequently noted tingling of the distal feet bilaterally.  She had a prior fracture of her left fifth toe which she thought might be contributing.  She reports a mild left foot drop.  She denies sphincteric difficulties or neck pain.  MRI of the lumbar spine performed in February 2023 revealed multilevel degenerative disc disease.  There was resolution of a prior left inferior paracentral disc extrusion at L3-4 compared to November 2022.  At L3-4, there was mild spinal stenosis with mild to moderately severe narrowing of the right lateral recess and moderate narrowing of the left lateral recess.  At L4-5, there was mild to moderate spinal canal stenosis and mild bilateral neuroforaminal stenosis.  At L5-S1, there was left-sided paravertebral osteophytes abutting the extraforaminal left L5 nerve root.  She underwent a recent left axillary node biopsy which revealed abnormal B-cell population and 35.2%  Past surgical history is notable for bilateral carpal tunnel releases and tonsillectomy.  She suffers from follicular non-Hodgkin's lymphoma.  There is no history of hypertension, diabetes, hyperlipidemia, cardiac, pulmonary, renal, hepatic, gastrointestinal, thyroid or cerebrovascular disease.  She has no allergies.  Her only medication is gabapentin 100 mg 3 times a day.  She is a former smoker and social drinker.  She is  and works as an .  She is .  Family history is notable for mother with diabetes, breast cancer, congestive heart failure and chronic renal insufficiency.  Her father suffered from non-Hodgkin's lymphoma and cancer of the colon.  Her sister and 2 of her sister's children suffer from inflammatory bowel disease.  Her sister's son suffered from breast cancer.

## 2023-08-23 NOTE — PHYSICAL EXAM
[FreeTextEntry1] : Constitutional:  Patient was well-developed, well-nourished and in no acute distress.   Head:  Normocephalic, atraumatic. Tympanic membranes were clear.   Neck:  Supple with full range of motion.   Cardiovascular:  Cardiac rhythm was regular without murmur. There were no carotid bruits. Peripheral pulses were full and symmetric.   Respiratory:  Lungs were clear.   Abdomen:  Soft and nontender.   Spine:  Nontender.   Skin:  There were no rashes.   NEUROLOGICAL EXAMINATION:  Mental Status: Patient was alert and oriented. Speech was fluent. There was no dysarthria.   Cranial Nerves:   II: She could finger count bilaterally. Pupils were equal and reactive. Visual fields were full. Funduscopic examination was normal.   III, IV, VI:  Eye movements were full without nystagmus.   V: Facial sensation was intact.   VII: Facial strength was normal.   VIII: Hearing was equal.   IX, X: Palatal movement was normal. Phonation was normal.   XI: Sternocleidomastoids and trapezii were normal.   XII: Tongue was midline and movements normal. There was no lingual atrophy or fasciculations.   Motor Examination: Muscle bulk, tone and strength were normal.  There was no weakness of the intrinsic hand muscles.  She was able to stand on her toes well.  She had mild difficulty standing on her left heel.  Sensory Examination: Vibration was diminished in the feet.  There was shading of pinprick in a stocking distribution.  There was no sensory deficit in the hands.  Joint position sense was intact.  There were no Tinel signs at the wrists or elbows.  Reflexes: DTRs were 2+ throughout.   Plantar Responses: Plantar responses were flexor.   Coordination/Cerebellar Function: There was no dysmetria on finger to nose or heel to shin testing.   Gait/Stance: Gait and tandem were normal. Romberg was negative.

## 2023-08-23 NOTE — ASSESSMENT
[FreeTextEntry1] : Mrs. Marte is a 68-year-old with a long history of follicular non-Hodgkin's lymphoma treated with rituximab.  More recently, she has developed symptoms consistent with median entrapments at the wrists which appear to have responded to carpal tunnel release.  In addition, she complains of left buttock and low back pain and bilateral distal lower extremity sensory symptoms.  I suspect that those symptoms represent a combination of lumbar radiculopathy and possibly a sensory polyneuropathy.  I suggested that she undergo a comprehensive serologic evaluation including immunoglobulin studies, MAG and TTR.  She is scheduled for EMG and nerve conduction studies to be performed by Dr. Flores later this week.  I look forward to reviewing his study.  Further management will depend upon those results and his clinical course.

## 2023-08-25 ENCOUNTER — LABORATORY RESULT (OUTPATIENT)
Age: 68
End: 2023-08-25

## 2023-08-25 ENCOUNTER — APPOINTMENT (OUTPATIENT)
Dept: NEUROLOGY | Facility: CLINIC | Age: 68
End: 2023-08-25
Payer: MEDICARE

## 2023-08-25 ENCOUNTER — TRANSCRIPTION ENCOUNTER (OUTPATIENT)
Age: 68
End: 2023-08-25

## 2023-08-25 DIAGNOSIS — M54.16 RADICULOPATHY, LUMBAR REGION: ICD-10-CM

## 2023-08-25 PROCEDURE — 95886 MUSC TEST DONE W/N TEST COMP: CPT | Mod: 59

## 2023-08-25 PROCEDURE — 95912 NRV CNDJ TEST 11-12 STUDIES: CPT

## 2023-08-26 ENCOUNTER — NON-APPOINTMENT (OUTPATIENT)
Age: 68
End: 2023-08-26

## 2023-08-26 LAB
CK SERPL-CCNC: 74 U/L
CRP SERPL-MCNC: 13 MG/L
ERYTHROCYTE [SEDIMENTATION RATE] IN BLOOD BY WESTERGREN METHOD: 9 MM/HR
HAV IGM SER QL: NONREACTIVE
HBV CORE IGM SER QL: NONREACTIVE
HBV SURFACE AG SER QL: NONREACTIVE
HCV AB SER QL: NONREACTIVE
HCV S/CO RATIO: 0.07 S/CO
RHEUMATOID FACT SER QL: <10 IU/ML
T PALLIDUM AB SER QL IA: NEGATIVE
TSH SERPL-ACNC: 0.5 UIU/ML

## 2023-08-27 LAB — CELIACPAN: NORMAL

## 2023-08-28 LAB
ALBUMIN MFR SERPL ELPH: 61.5 %
ALBUMIN SERPL-MCNC: 3.8 G/DL
ALBUMIN/GLOB SERPL: 1.7 RATIO
ALPHA1 GLOB MFR SERPL ELPH: 5.5 %
ALPHA1 GLOB SERPL ELPH-MCNC: 0.3 G/DL
ALPHA2 GLOB MFR SERPL ELPH: 10.6 %
ALPHA2 GLOB SERPL ELPH-MCNC: 0.6 G/DL
ANACR T: NEGATIVE
B-GLOBULIN MFR SERPL ELPH: 11.8 %
B-GLOBULIN SERPL ELPH-MCNC: 0.7 G/DL
DEPRECATED KAPPA LC FREE/LAMBDA SER: 1.04 RATIO
GAMMA GLOB FLD ELPH-MCNC: 0.6 G/DL
GAMMA GLOB MFR SERPL ELPH: 10.6 %
IGA SER QL IEP: 150 MG/DL
IGG SER QL IEP: 693 MG/DL
IGM SER QL IEP: 23 MG/DL
INTERPRETATION SERPL IEP-IMP: NORMAL
KAPPA LC CSF-MCNC: 1.71 MG/DL
KAPPA LC SERPL-MCNC: 1.78 MG/DL
M PROTEIN SPEC IFE-MCNC: NORMAL
PROT SERPL-MCNC: 6.1 G/DL
PROT SERPL-MCNC: 6.1 G/DL

## 2023-08-29 LAB
ASIALO-GM1 ANTIBODIES, IGG/IGM: 8 IV
GD1A ANTIBODIES, IGG/IGM: NORMAL IV
GD1B ANTIBODIES, IGG/IGM: 5 IV
GM1 ANTIBODIES, IGG/IGM: 5 IV
GM2 ANTIBODIES, IGG/IGM: 6 IV
GQ1B ANTIBODIES, IGG/IGM: 6 IV
IGA 24H UR QL IFE: NORMAL
VIT B1 SERPL-MCNC: 106.1 NMOL/L

## 2023-08-30 LAB
MISCELLANEOUS TEST: NORMAL
PROC NAME: NORMAL

## 2023-09-01 LAB
AMPA-R ABCBA: NEGATIVE
AMPHIPHYSIN IGG TITR SER IF: NEGATIVE
ANNOTATION COMMENT IMP: NORMAL
CASPR2-IGG CBA, S: NEGATIVE
COPPER SERPL-MCNC: 121 UG/DL
CV2 IGG TITR SER: NEGATIVE
GABA-B ABCBA: NEGATIVE
GAD65 AB SER-MCNC: 0 NMOL/L
GLIAL NUC TYPE 1 AB TITR SER: NEGATIVE
HTLV I+II AB SER QL: NORMAL
HU1 AB TITR SER: NEGATIVE
HU2 AB TITR SER IF: NEGATIVE
HU3 AB TITR SER: NEGATIVE
IGLON5 IFA, S: NEGATIVE
IMMUNOLOGIST REVIEW: NORMAL
LGI1-IGG CBA, S: NEGATIVE
METHYLMALONATE SERPL-SCNC: 188 NMOL/L
NIF IFA, S: NEGATIVE
NMDA-R ABCBA: NEGATIVE
PCA-1 AB TITR SER: NEGATIVE
PCA-2 AB TITR SER: NEGATIVE
PCA-TR AB TITR SER: NEGATIVE

## 2023-09-02 LAB — MAG AB SER QL: NEGATIVE

## 2023-09-07 ENCOUNTER — TRANSCRIPTION ENCOUNTER (OUTPATIENT)
Age: 68
End: 2023-09-07

## 2023-09-08 ENCOUNTER — TRANSCRIPTION ENCOUNTER (OUTPATIENT)
Age: 68
End: 2023-09-08

## 2023-09-10 ENCOUNTER — NON-APPOINTMENT (OUTPATIENT)
Age: 68
End: 2023-09-10

## 2023-09-11 DIAGNOSIS — G56.01 CARPAL TUNNEL SYNDROME, RIGHT UPPER LIMB: ICD-10-CM

## 2023-09-11 DIAGNOSIS — G56.02 CARPAL TUNNEL SYNDROME, LEFT UPPER LIMB: ICD-10-CM

## 2023-09-15 ENCOUNTER — APPOINTMENT (OUTPATIENT)
Dept: MRI IMAGING | Facility: CLINIC | Age: 68
End: 2023-09-15
Payer: MEDICARE

## 2023-09-15 ENCOUNTER — APPOINTMENT (OUTPATIENT)
Dept: NEUROLOGY | Facility: CLINIC | Age: 68
End: 2023-09-15
Payer: MEDICARE

## 2023-09-15 ENCOUNTER — APPOINTMENT (OUTPATIENT)
Dept: ORTHOPEDIC SURGERY | Facility: CLINIC | Age: 68
End: 2023-09-15
Payer: MEDICARE

## 2023-09-15 VITALS
HEIGHT: 68 IN | BODY MASS INDEX: 20.46 KG/M2 | SYSTOLIC BLOOD PRESSURE: 128 MMHG | HEART RATE: 57 BPM | WEIGHT: 135 LBS | DIASTOLIC BLOOD PRESSURE: 78 MMHG

## 2023-09-15 VITALS — HEIGHT: 68 IN | WEIGHT: 135 LBS | BODY MASS INDEX: 20.46 KG/M2

## 2023-09-15 DIAGNOSIS — M65.9 SYNOVITIS AND TENOSYNOVITIS, UNSPECIFIED: ICD-10-CM

## 2023-09-15 PROCEDURE — 99213 OFFICE O/P EST LOW 20 MIN: CPT | Mod: 24

## 2023-09-15 PROCEDURE — 73221 MRI JOINT UPR EXTREM W/O DYE: CPT | Mod: LT,MH

## 2023-09-15 PROCEDURE — 99214 OFFICE O/P EST MOD 30 MIN: CPT

## 2023-09-15 RX ORDER — METHYLPREDNISOLONE 4 MG/1
4 TABLET ORAL
Qty: 1 | Refills: 0 | Status: ACTIVE | COMMUNITY
Start: 2023-09-15 | End: 1900-01-01

## 2023-09-20 LAB — HEREDITARY NEUROPATHY PANEL: ABNORMAL

## 2023-09-21 ENCOUNTER — APPOINTMENT (OUTPATIENT)
Dept: ORTHOPEDIC SURGERY | Facility: CLINIC | Age: 68
End: 2023-09-21
Payer: MEDICARE

## 2023-09-21 VITALS — BODY MASS INDEX: 20.46 KG/M2 | WEIGHT: 135 LBS | HEIGHT: 68 IN

## 2023-09-21 DIAGNOSIS — M19.032 PRIMARY OSTEOARTHRITIS, LEFT WRIST: ICD-10-CM

## 2023-09-21 DIAGNOSIS — M19.031 PRIMARY OSTEOARTHRITIS, RIGHT WRIST: ICD-10-CM

## 2023-09-21 PROCEDURE — 99213 OFFICE O/P EST LOW 20 MIN: CPT | Mod: 24

## 2023-09-22 ENCOUNTER — APPOINTMENT (OUTPATIENT)
Dept: RADIOLOGY | Facility: CLINIC | Age: 68
End: 2023-09-22

## 2023-09-22 ENCOUNTER — APPOINTMENT (OUTPATIENT)
Dept: ULTRASOUND IMAGING | Facility: CLINIC | Age: 68
End: 2023-09-22

## 2023-09-28 ENCOUNTER — RX RENEWAL (OUTPATIENT)
Age: 68
End: 2023-09-28

## 2023-09-28 RX ORDER — GABAPENTIN 300 MG/1
300 CAPSULE ORAL
Qty: 90 | Refills: 1 | Status: ACTIVE | COMMUNITY
Start: 2023-02-13 | End: 1900-01-01

## 2023-10-04 ENCOUNTER — NON-APPOINTMENT (OUTPATIENT)
Age: 68
End: 2023-10-04

## 2023-10-05 NOTE — REASON FOR VISIT
Called and spoke to pt's mom about coming to clinic to be seen at 1pm today instead of 11:30am. Mom stated that she'd rather reschedule to another date. She RS to 1/8/2024 with Dr. Encarnacion at [facility]. Mom stated that visit was not an emergency but rather a means to establish care and get new Rx for feeding bags and syringes.     Mom requested that I reach out to pt's PCP to see if she can order some supplies in the meantime while we await the Peds GI appt. Pt looks to already be established with Susan Cornejo RD as her dietitian.  
[FreeTextEntry2] : F/U B/L hands

## 2023-11-28 ENCOUNTER — APPOINTMENT (OUTPATIENT)
Dept: RHEUMATOLOGY | Facility: CLINIC | Age: 68
End: 2023-11-28

## 2024-01-05 ENCOUNTER — APPOINTMENT (OUTPATIENT)
Dept: INTERNAL MEDICINE | Facility: CLINIC | Age: 69
End: 2024-01-05

## 2024-01-08 ENCOUNTER — TRANSCRIPTION ENCOUNTER (OUTPATIENT)
Age: 69
End: 2024-01-08

## 2024-04-01 ENCOUNTER — APPOINTMENT (OUTPATIENT)
Dept: RHEUMATOLOGY | Facility: CLINIC | Age: 69
End: 2024-04-01

## 2024-05-06 ENCOUNTER — TRANSCRIPTION ENCOUNTER (OUTPATIENT)
Age: 69
End: 2024-05-06

## 2024-05-13 ENCOUNTER — TRANSCRIPTION ENCOUNTER (OUTPATIENT)
Age: 69
End: 2024-05-13

## 2024-05-14 ENCOUNTER — TRANSCRIPTION ENCOUNTER (OUTPATIENT)
Age: 69
End: 2024-05-14

## 2024-05-15 ENCOUNTER — TRANSCRIPTION ENCOUNTER (OUTPATIENT)
Age: 69
End: 2024-05-15

## 2024-05-29 ENCOUNTER — APPOINTMENT (OUTPATIENT)
Dept: ORTHOPEDIC SURGERY | Facility: CLINIC | Age: 69
End: 2024-05-29
Payer: MEDICARE

## 2024-05-29 VITALS — WEIGHT: 140 LBS | HEIGHT: 68 IN | BODY MASS INDEX: 21.22 KG/M2

## 2024-05-29 DIAGNOSIS — Z87.891 PERSONAL HISTORY OF NICOTINE DEPENDENCE: ICD-10-CM

## 2024-05-29 DIAGNOSIS — M17.12 UNILATERAL PRIMARY OSTEOARTHRITIS, LEFT KNEE: ICD-10-CM

## 2024-05-29 DIAGNOSIS — M25.462 EFFUSION, LEFT KNEE: ICD-10-CM

## 2024-05-29 PROCEDURE — 20610 DRAIN/INJ JOINT/BURSA W/O US: CPT | Mod: LT

## 2024-05-29 PROCEDURE — 99214 OFFICE O/P EST MOD 30 MIN: CPT | Mod: 25

## 2024-05-29 PROCEDURE — 73564 X-RAY EXAM KNEE 4 OR MORE: CPT | Mod: LT

## 2024-05-29 NOTE — ASSESSMENT
[FreeTextEntry1] : Underlying pathology reviewed and treatment options discussed. 05/29/2024: x-rays, views, reveal OA. We discussed the findings of arthritis and the potential treatment options including CSI, visco injections, and PT. She is having a radiation therapy and a biopsy next week, she has clearance today to have CSI. Aspiration and CSI tolerated well. Activity modification as tolerated. Questions addressed. Follow up for gel injection.

## 2024-05-29 NOTE — HISTORY OF PRESENT ILLNESS
[Gradual] : gradual [Result of repetitive motion] : result of repetitive motion [5] : 5 [Tightness] : tightness [Constant] : constant [Household chores] : household chores [Social interactions] : social interactions [Full time] : Work status: full time [de-identified] : 05/29/2024: This is a 69-year-old female, right knee pain. She has history of rheumatoid arthritis. She was at a yoga class and has been feeling increased pain since. There is previous NSAIDs use, tried ice/heat therapy. There are night symptoms. There is no n/t. Patient reports she undergoes radiation therapy.     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

## 2024-05-29 NOTE — PROCEDURE
[FreeTextEntry3] : A Large joint injection was performed of the [LEFT KNEE]. The indication for this procedure was pain and inflammation, and patient had decreased mobility in the joint. Risks, benefits and alternatives to a steroid injection procedure were discussed; Risks outlined include but are not limited to infection, sepsis, bleeding, scarring, skin discoloration, temporary increase in pain, syncopal episode, failure to resolve symptoms, allergic reaction, symptom recurrence, and elevation of blood sugar in diabetics.. All questions were answered to the patient's apparent satisfaction and informed consent obtained. Prior to injection a 'Time Out' was conducted in accordance with [facility] policy and the site and nature of procedure verified with the patient.    Procedure: The area of injection was prepared in a sterile fashion. The injection and aspiration was carried out utilizing sterile technique. The site was prepped with alcohol, betadine, ethyl chloride sprayed topically and sterile technique used.   90 cc normal appearing synovial fluid aspirated.  ( X ) 1cc of Celestone(30mg/ml)  ( X ) 2cc of 1% Lidocaine   Patient tolerated the procedure well and direct pressure was applied for hemostasis. The patient was reminded of potential post-injection risks including, but not limited to, delayed hypersensitivity reactions and/or infection. Ice tonight to the injection site.

## 2024-05-30 ENCOUNTER — APPOINTMENT (OUTPATIENT)
Dept: ORTHOPEDIC SURGERY | Facility: CLINIC | Age: 69
End: 2024-05-30

## 2024-06-17 ENCOUNTER — APPOINTMENT (OUTPATIENT)
Dept: ORTHOPEDIC SURGERY | Facility: CLINIC | Age: 69
End: 2024-06-17
Payer: MEDICARE

## 2024-06-17 VITALS — WEIGHT: 140 LBS | HEIGHT: 68 IN | BODY MASS INDEX: 21.22 KG/M2

## 2024-06-17 PROCEDURE — 73140 X-RAY EXAM OF FINGER(S): CPT | Mod: RT

## 2024-06-17 PROCEDURE — 99213 OFFICE O/P EST LOW 20 MIN: CPT

## 2024-06-17 NOTE — HISTORY OF PRESENT ILLNESS
[de-identified] : She has RA  R SF Swelling, cannot ext after pushingf down on hand Wed night  [6] : 6 [Dull/Aching] : dull/aching [] : no [FreeTextEntry1] : DALE KEMP [FreeTextEntry5] : she got up from chair wrong way and can not bend sf  [de-identified] : b/l cts

## 2024-06-17 NOTE — ASSESSMENT
[FreeTextEntry1] : The patient was advised of the diagnosis. The natural history of the pathology was explained in full to the patient in layman's terms. All questions were answered. The risks and benefits of surgical and non-surgical treatment alternatives were explained in full to the patient.  MRI - eval for extensor tendon rupture. OT Brace Return after MRI

## 2024-06-17 NOTE — PHYSICAL EXAM
[de-identified] : R hand  Swelling dorsally along the ext tendon to the SF Tender Cannot ext the SF actively  Xrays OA

## 2024-06-19 ENCOUNTER — TRANSCRIPTION ENCOUNTER (OUTPATIENT)
Age: 69
End: 2024-06-19

## 2024-06-20 ENCOUNTER — APPOINTMENT (OUTPATIENT)
Dept: MRI IMAGING | Facility: CLINIC | Age: 69
End: 2024-06-20
Payer: MEDICARE

## 2024-06-20 PROCEDURE — 73221 MRI JOINT UPR EXTREM W/O DYE: CPT | Mod: RT,MH

## 2024-06-24 ENCOUNTER — APPOINTMENT (OUTPATIENT)
Dept: ORTHOPEDIC SURGERY | Facility: CLINIC | Age: 69
End: 2024-06-24
Payer: MEDICARE

## 2024-06-24 VITALS — HEIGHT: 68 IN | WEIGHT: 140 LBS | BODY MASS INDEX: 21.22 KG/M2

## 2024-06-24 DIAGNOSIS — S66.811A STRAIN OF OTHER SPECIFIED MUSCLES, FASCIA AND TENDONS AT WRIST AND HAND LEVEL, RIGHT HAND, INITIAL ENCOUNTER: ICD-10-CM

## 2024-06-24 PROCEDURE — 99213 OFFICE O/P EST LOW 20 MIN: CPT

## 2024-06-25 RX ORDER — GABAPENTIN 300 MG/1
300 CAPSULE ORAL
Qty: 540 | Refills: 3 | Status: ACTIVE | COMMUNITY
Start: 2024-06-25 | End: 1900-01-01

## 2024-07-08 ENCOUNTER — APPOINTMENT (OUTPATIENT)
Dept: ORTHOPEDIC SURGERY | Facility: CLINIC | Age: 69
End: 2024-07-08
Payer: MEDICARE

## 2024-07-08 VITALS — HEIGHT: 68 IN | BODY MASS INDEX: 21.22 KG/M2 | WEIGHT: 140 LBS

## 2024-07-08 DIAGNOSIS — S66.811A STRAIN OF OTHER SPECIFIED MUSCLES, FASCIA AND TENDONS AT WRIST AND HAND LEVEL, RIGHT HAND, INITIAL ENCOUNTER: ICD-10-CM

## 2024-07-08 PROCEDURE — 99213 OFFICE O/P EST LOW 20 MIN: CPT

## 2024-08-28 ENCOUNTER — APPOINTMENT (OUTPATIENT)
Dept: NEUROLOGY | Facility: CLINIC | Age: 69
End: 2024-08-28

## 2025-01-03 RX ORDER — GABAPENTIN 300 MG/1
300 CAPSULE ORAL
Qty: 540 | Refills: 3 | Status: ACTIVE | COMMUNITY
Start: 2025-01-03 | End: 1900-01-01

## 2025-01-08 ENCOUNTER — APPOINTMENT (OUTPATIENT)
Dept: ORTHOPEDIC SURGERY | Facility: CLINIC | Age: 70
End: 2025-01-08

## 2025-01-08 DIAGNOSIS — M17.11 UNILATERAL PRIMARY OSTEOARTHRITIS, RIGHT KNEE: ICD-10-CM

## 2025-01-08 DIAGNOSIS — Z00.00 ENCOUNTER FOR GENERAL ADULT MEDICAL EXAMINATION W/OUT ABNORMAL FINDINGS: ICD-10-CM

## 2025-01-08 PROCEDURE — 20610 DRAIN/INJ JOINT/BURSA W/O US: CPT | Mod: RT

## 2025-01-08 PROCEDURE — 73564 X-RAY EXAM KNEE 4 OR MORE: CPT | Mod: RT

## 2025-01-21 ENCOUNTER — TRANSCRIPTION ENCOUNTER (OUTPATIENT)
Age: 70
End: 2025-01-21

## 2025-01-29 ENCOUNTER — APPOINTMENT (OUTPATIENT)
Dept: ORTHOPEDIC SURGERY | Facility: CLINIC | Age: 70
End: 2025-01-29
Payer: COMMERCIAL

## 2025-01-29 VITALS — WEIGHT: 140 LBS | HEIGHT: 68 IN | BODY MASS INDEX: 21.22 KG/M2

## 2025-01-29 PROCEDURE — 99213 OFFICE O/P EST LOW 20 MIN: CPT

## 2025-01-30 ENCOUNTER — APPOINTMENT (OUTPATIENT)
Dept: ORTHOPEDIC SURGERY | Facility: CLINIC | Age: 70
End: 2025-01-30

## 2025-02-05 ENCOUNTER — APPOINTMENT (OUTPATIENT)
Dept: ORTHOPEDIC SURGERY | Facility: CLINIC | Age: 70
End: 2025-02-05
Payer: COMMERCIAL

## 2025-02-05 PROCEDURE — 20610 DRAIN/INJ JOINT/BURSA W/O US: CPT | Mod: RT

## 2025-02-12 ENCOUNTER — APPOINTMENT (OUTPATIENT)
Dept: ORTHOPEDIC SURGERY | Facility: CLINIC | Age: 70
End: 2025-02-12
Payer: COMMERCIAL

## 2025-02-12 PROCEDURE — 20610 DRAIN/INJ JOINT/BURSA W/O US: CPT | Mod: RT

## 2025-02-20 ENCOUNTER — APPOINTMENT (OUTPATIENT)
Dept: ORTHOPEDIC SURGERY | Facility: CLINIC | Age: 70
End: 2025-02-20
Payer: COMMERCIAL

## 2025-02-20 VITALS — HEIGHT: 68 IN | BODY MASS INDEX: 21.22 KG/M2 | WEIGHT: 140 LBS

## 2025-02-20 PROCEDURE — 20610 DRAIN/INJ JOINT/BURSA W/O US: CPT | Mod: RT

## 2025-02-26 ENCOUNTER — APPOINTMENT (OUTPATIENT)
Dept: ORTHOPEDIC SURGERY | Facility: CLINIC | Age: 70
End: 2025-02-26
Payer: COMMERCIAL

## 2025-02-26 ENCOUNTER — APPOINTMENT (OUTPATIENT)
Dept: NEUROLOGY | Facility: CLINIC | Age: 70
End: 2025-02-26

## 2025-02-26 DIAGNOSIS — M17.11 UNILATERAL PRIMARY OSTEOARTHRITIS, RIGHT KNEE: ICD-10-CM

## 2025-02-26 PROCEDURE — 20610 DRAIN/INJ JOINT/BURSA W/O US: CPT | Mod: RT

## 2025-05-22 ENCOUNTER — APPOINTMENT (OUTPATIENT)
Dept: ORTHOPEDIC SURGERY | Facility: CLINIC | Age: 70
End: 2025-05-22
Payer: COMMERCIAL

## 2025-05-22 VITALS — WEIGHT: 140 LBS | HEIGHT: 68 IN | BODY MASS INDEX: 21.22 KG/M2

## 2025-05-22 DIAGNOSIS — S66.811A STRAIN OF OTHER SPECIFIED MUSCLES, FASCIA AND TENDONS AT WRIST AND HAND LEVEL, RIGHT HAND, INITIAL ENCOUNTER: ICD-10-CM

## 2025-05-22 PROCEDURE — 99213 OFFICE O/P EST LOW 20 MIN: CPT

## 2025-06-26 ENCOUNTER — APPOINTMENT (OUTPATIENT)
Dept: ORTHOPEDIC SURGERY | Facility: CLINIC | Age: 70
End: 2025-06-26

## 2025-08-14 ENCOUNTER — TRANSCRIPTION ENCOUNTER (OUTPATIENT)
Age: 70
End: 2025-08-14

## 2025-08-14 ENCOUNTER — INPATIENT (INPATIENT)
Facility: HOSPITAL | Age: 70
LOS: 7 days | Discharge: NURSING, REHAB OR LONG-TERM CARE | DRG: 536 | End: 2025-08-22
Attending: ORTHOPAEDIC SURGERY | Admitting: ORTHOPAEDIC SURGERY
Payer: COMMERCIAL

## 2025-08-14 VITALS
TEMPERATURE: 97 F | OXYGEN SATURATION: 99 % | SYSTOLIC BLOOD PRESSURE: 107 MMHG | WEIGHT: 139.99 LBS | RESPIRATION RATE: 20 BRPM | DIASTOLIC BLOOD PRESSURE: 62 MMHG | HEART RATE: 84 BPM

## 2025-08-14 DIAGNOSIS — Z98.890 OTHER SPECIFIED POSTPROCEDURAL STATES: Chronic | ICD-10-CM

## 2025-08-14 DIAGNOSIS — S72.142A DISPLACED INTERTROCHANTERIC FRACTURE OF LEFT FEMUR, INITIAL ENCOUNTER FOR CLOSED FRACTURE: ICD-10-CM

## 2025-08-14 LAB
ALBUMIN SERPL ELPH-MCNC: 3.3 G/DL — SIGNIFICANT CHANGE UP (ref 3.3–5.2)
ALP SERPL-CCNC: 85 U/L — SIGNIFICANT CHANGE UP (ref 40–120)
ALT FLD-CCNC: 12 U/L — SIGNIFICANT CHANGE UP
ANION GAP SERPL CALC-SCNC: 12 MMOL/L — SIGNIFICANT CHANGE UP (ref 5–17)
APTT BLD: 41.4 SEC — HIGH (ref 26.1–36.8)
AST SERPL-CCNC: 17 U/L — SIGNIFICANT CHANGE UP
BILIRUB SERPL-MCNC: 0.7 MG/DL — SIGNIFICANT CHANGE UP (ref 0.4–2)
BLD GP AB SCN SERPL QL: SIGNIFICANT CHANGE UP
BUN SERPL-MCNC: 15.1 MG/DL — SIGNIFICANT CHANGE UP (ref 8–20)
CALCIUM SERPL-MCNC: 8.6 MG/DL — SIGNIFICANT CHANGE UP (ref 8.4–10.5)
CHLORIDE SERPL-SCNC: 98 MMOL/L — SIGNIFICANT CHANGE UP (ref 96–108)
CO2 SERPL-SCNC: 22 MMOL/L — SIGNIFICANT CHANGE UP (ref 22–29)
CREAT SERPL-MCNC: 0.52 MG/DL — SIGNIFICANT CHANGE UP (ref 0.5–1.3)
EGFR: 100 ML/MIN/1.73M2 — SIGNIFICANT CHANGE UP
EGFR: 100 ML/MIN/1.73M2 — SIGNIFICANT CHANGE UP
GLUCOSE SERPL-MCNC: 88 MG/DL — SIGNIFICANT CHANGE UP (ref 70–99)
HCT VFR BLD CALC: 32.2 % — LOW (ref 34.5–45)
HGB BLD-MCNC: 10.4 G/DL — LOW (ref 11.5–15.5)
INR BLD: 1.01 RATIO — SIGNIFICANT CHANGE UP (ref 0.85–1.16)
MCHC RBC-ENTMCNC: 30.7 PG — SIGNIFICANT CHANGE UP (ref 27–34)
MCHC RBC-ENTMCNC: 32.3 G/DL — SIGNIFICANT CHANGE UP (ref 32–36)
MCV RBC AUTO: 95 FL — SIGNIFICANT CHANGE UP (ref 80–100)
MRSA PCR RESULT.: SIGNIFICANT CHANGE UP
NRBC # BLD AUTO: 0 K/UL — SIGNIFICANT CHANGE UP (ref 0–0)
NRBC # FLD: 0 K/UL — SIGNIFICANT CHANGE UP (ref 0–0)
NRBC BLD AUTO-RTO: 0 /100 WBCS — SIGNIFICANT CHANGE UP (ref 0–0)
PLATELET # BLD AUTO: 234 K/UL — SIGNIFICANT CHANGE UP (ref 150–400)
PMV BLD: 9.2 FL — SIGNIFICANT CHANGE UP (ref 7–13)
POTASSIUM SERPL-MCNC: 4.7 MMOL/L — SIGNIFICANT CHANGE UP (ref 3.5–5.3)
POTASSIUM SERPL-SCNC: 4.7 MMOL/L — SIGNIFICANT CHANGE UP (ref 3.5–5.3)
PROT SERPL-MCNC: 5.6 G/DL — LOW (ref 6.6–8.7)
PROTHROM AB SERPL-ACNC: 11.4 SEC — SIGNIFICANT CHANGE UP (ref 9.9–13.4)
RBC # BLD: 3.39 M/UL — LOW (ref 3.8–5.2)
RBC # FLD: 14.1 % — SIGNIFICANT CHANGE UP (ref 10.3–14.5)
S AUREUS DNA NOSE QL NAA+PROBE: SIGNIFICANT CHANGE UP
SODIUM SERPL-SCNC: 132 MMOL/L — LOW (ref 135–145)
WBC # BLD: 5.89 K/UL — SIGNIFICANT CHANGE UP (ref 3.8–10.5)
WBC # FLD AUTO: 5.89 K/UL — SIGNIFICANT CHANGE UP (ref 3.8–10.5)

## 2025-08-14 PROCEDURE — 27245 TREAT THIGH FRACTURE: CPT | Mod: LT

## 2025-08-14 PROCEDURE — 87641 MR-STAPH DNA AMP PROBE: CPT

## 2025-08-14 PROCEDURE — 27245 TREAT THIGH FRACTURE: CPT | Mod: AS,LT

## 2025-08-14 PROCEDURE — 85027 COMPLETE CBC AUTOMATED: CPT

## 2025-08-14 PROCEDURE — 73552 X-RAY EXAM OF FEMUR 2/>: CPT | Mod: 26,LT

## 2025-08-14 PROCEDURE — 99285 EMERGENCY DEPT VISIT HI MDM: CPT

## 2025-08-14 PROCEDURE — 76000 FLUOROSCOPY <1 HR PHYS/QHP: CPT

## 2025-08-14 PROCEDURE — 87640 STAPH A DNA AMP PROBE: CPT

## 2025-08-14 PROCEDURE — 85610 PROTHROMBIN TIME: CPT

## 2025-08-14 PROCEDURE — 85730 THROMBOPLASTIN TIME PARTIAL: CPT

## 2025-08-14 PROCEDURE — 86901 BLOOD TYPING SEROLOGIC RH(D): CPT

## 2025-08-14 PROCEDURE — 93005 ELECTROCARDIOGRAM TRACING: CPT

## 2025-08-14 PROCEDURE — 93010 ELECTROCARDIOGRAM REPORT: CPT

## 2025-08-14 PROCEDURE — 93970 EXTREMITY STUDY: CPT

## 2025-08-14 PROCEDURE — 93970 EXTREMITY STUDY: CPT | Mod: 26

## 2025-08-14 PROCEDURE — 86850 RBC ANTIBODY SCREEN: CPT

## 2025-08-14 PROCEDURE — 27095 INJECTION FOR HIP X-RAY: CPT | Mod: LT

## 2025-08-14 PROCEDURE — 99223 1ST HOSP IP/OBS HIGH 75: CPT | Mod: 57,25

## 2025-08-14 PROCEDURE — 99222 1ST HOSP IP/OBS MODERATE 55: CPT

## 2025-08-14 PROCEDURE — 71045 X-RAY EXAM CHEST 1 VIEW: CPT

## 2025-08-14 PROCEDURE — 73552 X-RAY EXAM OF FEMUR 2/>: CPT

## 2025-08-14 PROCEDURE — 36415 COLL VENOUS BLD VENIPUNCTURE: CPT

## 2025-08-14 PROCEDURE — 86900 BLOOD TYPING SEROLOGIC ABO: CPT

## 2025-08-14 PROCEDURE — 80053 COMPREHEN METABOLIC PANEL: CPT

## 2025-08-14 PROCEDURE — 71045 X-RAY EXAM CHEST 1 VIEW: CPT | Mod: 26

## 2025-08-14 DEVICE — GRAFT BONE INJ CANN TRAUMACEM FOR TFNA: Type: IMPLANTABLE DEVICE | Site: LEFT | Status: FUNCTIONAL

## 2025-08-14 DEVICE — BLADE TFNA HELICAL 95MM: Type: IMPLANTABLE DEVICE | Site: LEFT | Status: FUNCTIONAL

## 2025-08-14 DEVICE — GRAFT BONE INJ TRAUMACEM TM V PLUS BONE CEMENT: Type: IMPLANTABLE DEVICE | Site: LEFT | Status: FUNCTIONAL

## 2025-08-14 DEVICE — KIT SYRINGE TRAUMACEM V PLUS STRL: Type: IMPLANTABLE DEVICE | Site: LEFT | Status: FUNCTIONAL

## 2025-08-14 DEVICE — NAIL CANN TI 130DEG 11X170MM: Type: IMPLANTABLE DEVICE | Site: LEFT | Status: FUNCTIONAL

## 2025-08-14 DEVICE — IMPLANTABLE DEVICE: Type: IMPLANTABLE DEVICE | Site: LEFT | Status: FUNCTIONAL

## 2025-08-14 RX ORDER — ACETAMINOPHEN 500 MG/5ML
1000 LIQUID (ML) ORAL ONCE
Refills: 0 | Status: COMPLETED | OUTPATIENT
Start: 2025-08-14 | End: 2025-08-14

## 2025-08-14 RX ORDER — OXYCODONE HYDROCHLORIDE 30 MG/1
5 TABLET ORAL
Refills: 0 | Status: DISCONTINUED | OUTPATIENT
Start: 2025-08-14 | End: 2025-08-14

## 2025-08-14 RX ORDER — APREPITANT 40 MG/1
40 CAPSULE ORAL ONCE
Refills: 0 | Status: COMPLETED | OUTPATIENT
Start: 2025-08-14 | End: 2025-08-14

## 2025-08-14 RX ORDER — ACETAMINOPHEN 500 MG/5ML
975 LIQUID (ML) ORAL EVERY 8 HOURS
Refills: 0 | Status: DISCONTINUED | OUTPATIENT
Start: 2025-08-14 | End: 2025-08-14

## 2025-08-14 RX ORDER — FENTANYL CITRATE-0.9 % NACL/PF 100MCG/2ML
50 SYRINGE (ML) INTRAVENOUS
Refills: 0 | Status: DISCONTINUED | OUTPATIENT
Start: 2025-08-14 | End: 2025-08-14

## 2025-08-14 RX ORDER — BISACODYL 5 MG
10 TABLET, DELAYED RELEASE (ENTERIC COATED) ORAL DAILY
Refills: 0 | Status: DISCONTINUED | OUTPATIENT
Start: 2025-08-14 | End: 2025-08-22

## 2025-08-14 RX ORDER — VANCOMYCIN HCL IN 5 % DEXTROSE 1.5G/250ML
1000 PLASTIC BAG, INJECTION (ML) INTRAVENOUS ONCE
Refills: 0 | Status: DISCONTINUED | OUTPATIENT
Start: 2025-08-14 | End: 2025-08-14

## 2025-08-14 RX ORDER — SODIUM CHLORIDE 9 G/1000ML
1000 INJECTION, SOLUTION INTRAVENOUS
Refills: 0 | Status: DISCONTINUED | OUTPATIENT
Start: 2025-08-14 | End: 2025-08-14

## 2025-08-14 RX ORDER — ACETAMINOPHEN 500 MG/5ML
975 LIQUID (ML) ORAL EVERY 8 HOURS
Refills: 0 | Status: DISCONTINUED | OUTPATIENT
Start: 2025-08-14 | End: 2025-08-22

## 2025-08-14 RX ORDER — MUPIROCIN CALCIUM 20 MG/G
1 CREAM TOPICAL
Refills: 0 | Status: DISCONTINUED | OUTPATIENT
Start: 2025-08-14 | End: 2025-08-14

## 2025-08-14 RX ORDER — HYDROMORPHONE/SOD CHLOR,ISO/PF 2 MG/10 ML
4 SYRINGE (ML) INJECTION EVERY 4 HOURS
Refills: 0 | Status: DISCONTINUED | OUTPATIENT
Start: 2025-08-14 | End: 2025-08-14

## 2025-08-14 RX ORDER — MAGNESIUM, ALUMINUM HYDROXIDE 200-200 MG
30 TABLET,CHEWABLE ORAL
Refills: 0 | Status: DISCONTINUED | OUTPATIENT
Start: 2025-08-14 | End: 2025-08-22

## 2025-08-14 RX ORDER — OXYCODONE HYDROCHLORIDE 30 MG/1
5 TABLET ORAL EVERY 4 HOURS
Refills: 0 | Status: DISCONTINUED | OUTPATIENT
Start: 2025-08-14 | End: 2025-08-21

## 2025-08-14 RX ORDER — MAGNESIUM, ALUMINUM HYDROXIDE 200-200 MG
30 TABLET,CHEWABLE ORAL
Refills: 0 | Status: DISCONTINUED | OUTPATIENT
Start: 2025-08-14 | End: 2025-08-14

## 2025-08-14 RX ORDER — ONDANSETRON HCL/PF 4 MG/2 ML
4 VIAL (ML) INJECTION EVERY 6 HOURS
Refills: 0 | Status: DISCONTINUED | OUTPATIENT
Start: 2025-08-14 | End: 2025-08-22

## 2025-08-14 RX ORDER — CEFAZOLIN SODIUM IN 0.9 % NACL 3 G/100 ML
2000 INTRAVENOUS SOLUTION, PIGGYBACK (ML) INTRAVENOUS ONCE
Refills: 0 | Status: DISCONTINUED | OUTPATIENT
Start: 2025-08-14 | End: 2025-08-14

## 2025-08-14 RX ORDER — KETOROLAC TROMETHAMINE 30 MG/ML
15 INJECTION, SOLUTION INTRAMUSCULAR; INTRAVENOUS EVERY 6 HOURS
Refills: 0 | Status: DISCONTINUED | OUTPATIENT
Start: 2025-08-14 | End: 2025-08-15

## 2025-08-14 RX ORDER — ONDANSETRON HCL/PF 4 MG/2 ML
4 VIAL (ML) INJECTION ONCE
Refills: 0 | Status: DISCONTINUED | OUTPATIENT
Start: 2025-08-14 | End: 2025-08-14

## 2025-08-14 RX ORDER — ONDANSETRON HCL/PF 4 MG/2 ML
4 VIAL (ML) INJECTION EVERY 6 HOURS
Refills: 0 | Status: DISCONTINUED | OUTPATIENT
Start: 2025-08-14 | End: 2025-08-14

## 2025-08-14 RX ORDER — MAGNESIUM HYDROXIDE 400 MG/5ML
30 SUSPENSION ORAL DAILY
Refills: 0 | Status: DISCONTINUED | OUTPATIENT
Start: 2025-08-14 | End: 2025-08-22

## 2025-08-14 RX ORDER — OXYCODONE HYDROCHLORIDE 30 MG/1
10 TABLET ORAL
Refills: 0 | Status: DISCONTINUED | OUTPATIENT
Start: 2025-08-14 | End: 2025-08-14

## 2025-08-14 RX ORDER — POVIDONE-IODINE 7.5 %
1 SOLUTION, NON-ORAL TOPICAL ONCE
Refills: 0 | Status: COMPLETED | OUTPATIENT
Start: 2025-08-14 | End: 2025-08-14

## 2025-08-14 RX ORDER — ENOXAPARIN SODIUM 100 MG/ML
40 INJECTION SUBCUTANEOUS EVERY 24 HOURS
Refills: 0 | Status: DISCONTINUED | OUTPATIENT
Start: 2025-08-15 | End: 2025-08-22

## 2025-08-14 RX ORDER — OXYCODONE HYDROCHLORIDE 30 MG/1
10 TABLET ORAL EVERY 4 HOURS
Refills: 0 | Status: DISCONTINUED | OUTPATIENT
Start: 2025-08-14 | End: 2025-08-21

## 2025-08-14 RX ORDER — CEFAZOLIN SODIUM IN 0.9 % NACL 3 G/100 ML
2000 INTRAVENOUS SOLUTION, PIGGYBACK (ML) INTRAVENOUS
Refills: 0 | Status: COMPLETED | OUTPATIENT
Start: 2025-08-14 | End: 2025-08-15

## 2025-08-14 RX ORDER — GABAPENTIN 400 MG/1
300 CAPSULE ORAL THREE TIMES A DAY
Refills: 0 | Status: DISCONTINUED | OUTPATIENT
Start: 2025-08-14 | End: 2025-08-22

## 2025-08-14 RX ADMIN — Medication 1 APPLICATION(S): at 19:32

## 2025-08-14 RX ADMIN — APREPITANT 40 MILLIGRAM(S): 40 CAPSULE ORAL at 19:27

## 2025-08-14 RX ADMIN — Medication 400 MILLIGRAM(S): at 16:33

## 2025-08-14 RX ADMIN — Medication 50 MICROGRAM(S): at 22:25

## 2025-08-14 RX ADMIN — Medication 50 MICROGRAM(S): at 22:15

## 2025-08-14 RX ADMIN — Medication 500 MILLILITER(S): at 15:45

## 2025-08-14 RX ADMIN — OXYCODONE HYDROCHLORIDE 5 MILLIGRAM(S): 30 TABLET ORAL at 18:30

## 2025-08-15 LAB
ANION GAP SERPL CALC-SCNC: 12 MMOL/L — SIGNIFICANT CHANGE UP (ref 5–17)
BUN SERPL-MCNC: 14.8 MG/DL — SIGNIFICANT CHANGE UP (ref 8–20)
CALCIUM SERPL-MCNC: 8.7 MG/DL — SIGNIFICANT CHANGE UP (ref 8.4–10.5)
CHLORIDE SERPL-SCNC: 100 MMOL/L — SIGNIFICANT CHANGE UP (ref 96–108)
CO2 SERPL-SCNC: 23 MMOL/L — SIGNIFICANT CHANGE UP (ref 22–29)
CREAT SERPL-MCNC: 0.68 MG/DL — SIGNIFICANT CHANGE UP (ref 0.5–1.3)
EGFR: 94 ML/MIN/1.73M2 — SIGNIFICANT CHANGE UP
EGFR: 94 ML/MIN/1.73M2 — SIGNIFICANT CHANGE UP
GLUCOSE SERPL-MCNC: 136 MG/DL — HIGH (ref 70–99)
HCT VFR BLD CALC: 29.9 % — LOW (ref 34.5–45)
HGB BLD-MCNC: 9.7 G/DL — LOW (ref 11.5–15.5)
MAGNESIUM SERPL-MCNC: 1.9 MG/DL — SIGNIFICANT CHANGE UP (ref 1.6–2.6)
MCHC RBC-ENTMCNC: 30.7 PG — SIGNIFICANT CHANGE UP (ref 27–34)
MCHC RBC-ENTMCNC: 32.4 G/DL — SIGNIFICANT CHANGE UP (ref 32–36)
MCV RBC AUTO: 94.6 FL — SIGNIFICANT CHANGE UP (ref 80–100)
NRBC # BLD AUTO: 0 K/UL — SIGNIFICANT CHANGE UP (ref 0–0)
NRBC # FLD: 0 K/UL — SIGNIFICANT CHANGE UP (ref 0–0)
NRBC BLD AUTO-RTO: 0 /100 WBCS — SIGNIFICANT CHANGE UP (ref 0–0)
PHOSPHATE SERPL-MCNC: 3.5 MG/DL — SIGNIFICANT CHANGE UP (ref 2.4–4.7)
PLATELET # BLD AUTO: 222 K/UL — SIGNIFICANT CHANGE UP (ref 150–400)
PMV BLD: 8.4 FL — SIGNIFICANT CHANGE UP (ref 7–13)
POTASSIUM SERPL-MCNC: 5.4 MMOL/L — HIGH (ref 3.5–5.3)
POTASSIUM SERPL-SCNC: 5.4 MMOL/L — HIGH (ref 3.5–5.3)
RBC # BLD: 3.16 M/UL — LOW (ref 3.8–5.2)
RBC # FLD: 13.4 % — SIGNIFICANT CHANGE UP (ref 10.3–14.5)
SODIUM SERPL-SCNC: 135 MMOL/L — SIGNIFICANT CHANGE UP (ref 135–145)
WBC # BLD: 8.24 K/UL — SIGNIFICANT CHANGE UP (ref 3.8–10.5)
WBC # FLD AUTO: 8.24 K/UL — SIGNIFICANT CHANGE UP (ref 3.8–10.5)

## 2025-08-15 PROCEDURE — 99232 SBSQ HOSP IP/OBS MODERATE 35: CPT

## 2025-08-15 RX ADMIN — Medication 150 MILLILITER(S): at 07:39

## 2025-08-15 RX ADMIN — OXYCODONE HYDROCHLORIDE 5 MILLIGRAM(S): 30 TABLET ORAL at 00:43

## 2025-08-15 RX ADMIN — OXYCODONE HYDROCHLORIDE 10 MILLIGRAM(S): 30 TABLET ORAL at 11:40

## 2025-08-15 RX ADMIN — KETOROLAC TROMETHAMINE 15 MILLIGRAM(S): 30 INJECTION, SOLUTION INTRAMUSCULAR; INTRAVENOUS at 12:36

## 2025-08-15 RX ADMIN — KETOROLAC TROMETHAMINE 15 MILLIGRAM(S): 30 INJECTION, SOLUTION INTRAMUSCULAR; INTRAVENOUS at 01:20

## 2025-08-15 RX ADMIN — KETOROLAC TROMETHAMINE 15 MILLIGRAM(S): 30 INJECTION, SOLUTION INTRAMUSCULAR; INTRAVENOUS at 00:21

## 2025-08-15 RX ADMIN — OXYCODONE HYDROCHLORIDE 5 MILLIGRAM(S): 30 TABLET ORAL at 06:26

## 2025-08-15 RX ADMIN — GABAPENTIN 300 MILLIGRAM(S): 400 CAPSULE ORAL at 21:20

## 2025-08-15 RX ADMIN — Medication 975 MILLIGRAM(S): at 21:20

## 2025-08-15 RX ADMIN — Medication 2000 MILLIGRAM(S): at 08:59

## 2025-08-15 RX ADMIN — GABAPENTIN 300 MILLIGRAM(S): 400 CAPSULE ORAL at 00:22

## 2025-08-15 RX ADMIN — OXYCODONE HYDROCHLORIDE 10 MILLIGRAM(S): 30 TABLET ORAL at 10:55

## 2025-08-15 RX ADMIN — KETOROLAC TROMETHAMINE 15 MILLIGRAM(S): 30 INJECTION, SOLUTION INTRAMUSCULAR; INTRAVENOUS at 17:32

## 2025-08-15 RX ADMIN — KETOROLAC TROMETHAMINE 15 MILLIGRAM(S): 30 INJECTION, SOLUTION INTRAMUSCULAR; INTRAVENOUS at 06:45

## 2025-08-15 RX ADMIN — KETOROLAC TROMETHAMINE 15 MILLIGRAM(S): 30 INJECTION, SOLUTION INTRAMUSCULAR; INTRAVENOUS at 18:05

## 2025-08-15 RX ADMIN — Medication 975 MILLIGRAM(S): at 15:10

## 2025-08-15 RX ADMIN — Medication 975 MILLIGRAM(S): at 00:38

## 2025-08-15 RX ADMIN — GABAPENTIN 300 MILLIGRAM(S): 400 CAPSULE ORAL at 14:38

## 2025-08-15 RX ADMIN — Medication 2000 MILLIGRAM(S): at 02:30

## 2025-08-15 RX ADMIN — OXYCODONE HYDROCHLORIDE 5 MILLIGRAM(S): 30 TABLET ORAL at 01:40

## 2025-08-15 RX ADMIN — ENOXAPARIN SODIUM 40 MILLIGRAM(S): 100 INJECTION SUBCUTANEOUS at 00:30

## 2025-08-15 RX ADMIN — Medication 975 MILLIGRAM(S): at 01:30

## 2025-08-15 RX ADMIN — KETOROLAC TROMETHAMINE 15 MILLIGRAM(S): 30 INJECTION, SOLUTION INTRAMUSCULAR; INTRAVENOUS at 12:06

## 2025-08-15 RX ADMIN — Medication 150 MILLILITER(S): at 00:25

## 2025-08-15 RX ADMIN — Medication 10 MILLIGRAM(S): at 10:51

## 2025-08-15 RX ADMIN — KETOROLAC TROMETHAMINE 15 MILLIGRAM(S): 30 INJECTION, SOLUTION INTRAMUSCULAR; INTRAVENOUS at 05:57

## 2025-08-15 RX ADMIN — Medication 975 MILLIGRAM(S): at 14:37

## 2025-08-16 ENCOUNTER — TRANSCRIPTION ENCOUNTER (OUTPATIENT)
Age: 70
End: 2025-08-16

## 2025-08-16 LAB
ANION GAP SERPL CALC-SCNC: 14 MMOL/L — SIGNIFICANT CHANGE UP (ref 5–17)
BUN SERPL-MCNC: 14.7 MG/DL — SIGNIFICANT CHANGE UP (ref 8–20)
CALCIUM SERPL-MCNC: 8.5 MG/DL — SIGNIFICANT CHANGE UP (ref 8.4–10.5)
CHLORIDE SERPL-SCNC: 101 MMOL/L — SIGNIFICANT CHANGE UP (ref 96–108)
CO2 SERPL-SCNC: 18 MMOL/L — LOW (ref 22–29)
CREAT SERPL-MCNC: 0.48 MG/DL — LOW (ref 0.5–1.3)
EGFR: 102 ML/MIN/1.73M2 — SIGNIFICANT CHANGE UP
EGFR: 102 ML/MIN/1.73M2 — SIGNIFICANT CHANGE UP
GLUCOSE SERPL-MCNC: 80 MG/DL — SIGNIFICANT CHANGE UP (ref 70–99)
POTASSIUM SERPL-MCNC: 4.7 MMOL/L — SIGNIFICANT CHANGE UP (ref 3.5–5.3)
POTASSIUM SERPL-SCNC: 4.7 MMOL/L — SIGNIFICANT CHANGE UP (ref 3.5–5.3)
SODIUM SERPL-SCNC: 133 MMOL/L — LOW (ref 135–145)

## 2025-08-16 PROCEDURE — 99232 SBSQ HOSP IP/OBS MODERATE 35: CPT

## 2025-08-16 RX ADMIN — ENOXAPARIN SODIUM 40 MILLIGRAM(S): 100 INJECTION SUBCUTANEOUS at 01:01

## 2025-08-16 RX ADMIN — OXYCODONE HYDROCHLORIDE 10 MILLIGRAM(S): 30 TABLET ORAL at 12:14

## 2025-08-16 RX ADMIN — Medication 975 MILLIGRAM(S): at 14:39

## 2025-08-16 RX ADMIN — OXYCODONE HYDROCHLORIDE 10 MILLIGRAM(S): 30 TABLET ORAL at 20:00

## 2025-08-16 RX ADMIN — OXYCODONE HYDROCHLORIDE 5 MILLIGRAM(S): 30 TABLET ORAL at 09:02

## 2025-08-16 RX ADMIN — Medication 975 MILLIGRAM(S): at 21:33

## 2025-08-16 RX ADMIN — OXYCODONE HYDROCHLORIDE 10 MILLIGRAM(S): 30 TABLET ORAL at 19:00

## 2025-08-16 RX ADMIN — GABAPENTIN 300 MILLIGRAM(S): 400 CAPSULE ORAL at 06:29

## 2025-08-16 RX ADMIN — GABAPENTIN 300 MILLIGRAM(S): 400 CAPSULE ORAL at 14:39

## 2025-08-16 RX ADMIN — Medication 975 MILLIGRAM(S): at 15:39

## 2025-08-16 RX ADMIN — Medication 975 MILLIGRAM(S): at 22:30

## 2025-08-16 RX ADMIN — OXYCODONE HYDROCHLORIDE 5 MILLIGRAM(S): 30 TABLET ORAL at 10:02

## 2025-08-16 RX ADMIN — Medication 975 MILLIGRAM(S): at 06:29

## 2025-08-16 RX ADMIN — GABAPENTIN 300 MILLIGRAM(S): 400 CAPSULE ORAL at 21:34

## 2025-08-16 RX ADMIN — OXYCODONE HYDROCHLORIDE 10 MILLIGRAM(S): 30 TABLET ORAL at 13:14

## 2025-08-17 PROCEDURE — 99232 SBSQ HOSP IP/OBS MODERATE 35: CPT

## 2025-08-17 RX ORDER — POLYETHYLENE GLYCOL 3350 17 G/17G
17 POWDER, FOR SOLUTION ORAL
Refills: 0 | Status: DISCONTINUED | OUTPATIENT
Start: 2025-08-17 | End: 2025-08-22

## 2025-08-17 RX ORDER — SENNA 187 MG
2 TABLET ORAL AT BEDTIME
Refills: 0 | Status: DISCONTINUED | OUTPATIENT
Start: 2025-08-17 | End: 2025-08-22

## 2025-08-17 RX ADMIN — Medication 975 MILLIGRAM(S): at 21:24

## 2025-08-17 RX ADMIN — OXYCODONE HYDROCHLORIDE 10 MILLIGRAM(S): 30 TABLET ORAL at 10:00

## 2025-08-17 RX ADMIN — OXYCODONE HYDROCHLORIDE 10 MILLIGRAM(S): 30 TABLET ORAL at 15:04

## 2025-08-17 RX ADMIN — OXYCODONE HYDROCHLORIDE 10 MILLIGRAM(S): 30 TABLET ORAL at 05:15

## 2025-08-17 RX ADMIN — OXYCODONE HYDROCHLORIDE 10 MILLIGRAM(S): 30 TABLET ORAL at 11:00

## 2025-08-17 RX ADMIN — Medication 975 MILLIGRAM(S): at 05:16

## 2025-08-17 RX ADMIN — OXYCODONE HYDROCHLORIDE 10 MILLIGRAM(S): 30 TABLET ORAL at 06:15

## 2025-08-17 RX ADMIN — OXYCODONE HYDROCHLORIDE 10 MILLIGRAM(S): 30 TABLET ORAL at 14:04

## 2025-08-17 RX ADMIN — ENOXAPARIN SODIUM 40 MILLIGRAM(S): 100 INJECTION SUBCUTANEOUS at 05:20

## 2025-08-17 RX ADMIN — Medication 975 MILLIGRAM(S): at 22:00

## 2025-08-17 RX ADMIN — Medication 975 MILLIGRAM(S): at 12:39

## 2025-08-17 RX ADMIN — GABAPENTIN 300 MILLIGRAM(S): 400 CAPSULE ORAL at 15:31

## 2025-08-17 RX ADMIN — GABAPENTIN 300 MILLIGRAM(S): 400 CAPSULE ORAL at 21:24

## 2025-08-17 RX ADMIN — Medication 975 MILLIGRAM(S): at 06:00

## 2025-08-17 RX ADMIN — OXYCODONE HYDROCHLORIDE 10 MILLIGRAM(S): 30 TABLET ORAL at 18:48

## 2025-08-17 RX ADMIN — GABAPENTIN 300 MILLIGRAM(S): 400 CAPSULE ORAL at 05:16

## 2025-08-17 RX ADMIN — Medication 2 TABLET(S): at 21:24

## 2025-08-17 RX ADMIN — POLYETHYLENE GLYCOL 3350 17 GRAM(S): 17 POWDER, FOR SOLUTION ORAL at 18:48

## 2025-08-17 RX ADMIN — OXYCODONE HYDROCHLORIDE 10 MILLIGRAM(S): 30 TABLET ORAL at 19:48

## 2025-08-17 RX ADMIN — Medication 975 MILLIGRAM(S): at 13:39

## 2025-08-18 PROCEDURE — 99232 SBSQ HOSP IP/OBS MODERATE 35: CPT

## 2025-08-18 PROCEDURE — 99223 1ST HOSP IP/OBS HIGH 75: CPT

## 2025-08-18 RX ADMIN — GABAPENTIN 300 MILLIGRAM(S): 400 CAPSULE ORAL at 13:34

## 2025-08-18 RX ADMIN — OXYCODONE HYDROCHLORIDE 10 MILLIGRAM(S): 30 TABLET ORAL at 05:54

## 2025-08-18 RX ADMIN — Medication 975 MILLIGRAM(S): at 21:00

## 2025-08-18 RX ADMIN — GABAPENTIN 300 MILLIGRAM(S): 400 CAPSULE ORAL at 05:51

## 2025-08-18 RX ADMIN — OXYCODONE HYDROCHLORIDE 5 MILLIGRAM(S): 30 TABLET ORAL at 10:34

## 2025-08-18 RX ADMIN — OXYCODONE HYDROCHLORIDE 10 MILLIGRAM(S): 30 TABLET ORAL at 06:54

## 2025-08-18 RX ADMIN — POLYETHYLENE GLYCOL 3350 17 GRAM(S): 17 POWDER, FOR SOLUTION ORAL at 10:52

## 2025-08-18 RX ADMIN — Medication 10 MILLIGRAM(S): at 13:35

## 2025-08-18 RX ADMIN — Medication 975 MILLIGRAM(S): at 05:51

## 2025-08-18 RX ADMIN — Medication 975 MILLIGRAM(S): at 13:35

## 2025-08-18 RX ADMIN — Medication 500 MILLILITER(S): at 10:52

## 2025-08-18 RX ADMIN — OXYCODONE HYDROCHLORIDE 10 MILLIGRAM(S): 30 TABLET ORAL at 20:37

## 2025-08-18 RX ADMIN — OXYCODONE HYDROCHLORIDE 10 MILLIGRAM(S): 30 TABLET ORAL at 14:43

## 2025-08-18 RX ADMIN — GABAPENTIN 300 MILLIGRAM(S): 400 CAPSULE ORAL at 20:37

## 2025-08-18 RX ADMIN — Medication 975 MILLIGRAM(S): at 14:05

## 2025-08-18 RX ADMIN — OXYCODONE HYDROCHLORIDE 10 MILLIGRAM(S): 30 TABLET ORAL at 21:37

## 2025-08-18 RX ADMIN — Medication 975 MILLIGRAM(S): at 20:37

## 2025-08-18 RX ADMIN — ENOXAPARIN SODIUM 40 MILLIGRAM(S): 100 INJECTION SUBCUTANEOUS at 05:50

## 2025-08-18 RX ADMIN — OXYCODONE HYDROCHLORIDE 5 MILLIGRAM(S): 30 TABLET ORAL at 11:10

## 2025-08-18 RX ADMIN — OXYCODONE HYDROCHLORIDE 10 MILLIGRAM(S): 30 TABLET ORAL at 15:20

## 2025-08-18 RX ADMIN — Medication 975 MILLIGRAM(S): at 06:46

## 2025-08-19 LAB
ANION GAP SERPL CALC-SCNC: 13 MMOL/L — SIGNIFICANT CHANGE UP (ref 5–17)
BUN SERPL-MCNC: 12.2 MG/DL — SIGNIFICANT CHANGE UP (ref 8–20)
CALCIUM SERPL-MCNC: 9.3 MG/DL — SIGNIFICANT CHANGE UP (ref 8.4–10.5)
CHLORIDE SERPL-SCNC: 96 MMOL/L — SIGNIFICANT CHANGE UP (ref 96–108)
CO2 SERPL-SCNC: 25 MMOL/L — SIGNIFICANT CHANGE UP (ref 22–29)
CREAT SERPL-MCNC: 0.53 MG/DL — SIGNIFICANT CHANGE UP (ref 0.5–1.3)
EGFR: 99 ML/MIN/1.73M2 — SIGNIFICANT CHANGE UP
EGFR: 99 ML/MIN/1.73M2 — SIGNIFICANT CHANGE UP
GLUCOSE SERPL-MCNC: 112 MG/DL — HIGH (ref 70–99)
HCT VFR BLD CALC: 29.3 % — LOW (ref 34.5–45)
HGB BLD-MCNC: 9.6 G/DL — LOW (ref 11.5–15.5)
MCHC RBC-ENTMCNC: 30.8 PG — SIGNIFICANT CHANGE UP (ref 27–34)
MCHC RBC-ENTMCNC: 32.8 G/DL — SIGNIFICANT CHANGE UP (ref 32–36)
MCV RBC AUTO: 93.9 FL — SIGNIFICANT CHANGE UP (ref 80–100)
NRBC # BLD AUTO: 0 K/UL — SIGNIFICANT CHANGE UP (ref 0–0)
NRBC # FLD: 0 K/UL — SIGNIFICANT CHANGE UP (ref 0–0)
NRBC BLD AUTO-RTO: 0 /100 WBCS — SIGNIFICANT CHANGE UP (ref 0–0)
PLATELET # BLD AUTO: 331 K/UL — SIGNIFICANT CHANGE UP (ref 150–400)
PMV BLD: 8.4 FL — SIGNIFICANT CHANGE UP (ref 7–13)
POTASSIUM SERPL-MCNC: 4.6 MMOL/L — SIGNIFICANT CHANGE UP (ref 3.5–5.3)
POTASSIUM SERPL-SCNC: 4.6 MMOL/L — SIGNIFICANT CHANGE UP (ref 3.5–5.3)
RBC # BLD: 3.12 M/UL — LOW (ref 3.8–5.2)
RBC # FLD: 14.1 % — SIGNIFICANT CHANGE UP (ref 10.3–14.5)
SODIUM SERPL-SCNC: 134 MMOL/L — LOW (ref 135–145)
WBC # BLD: 11.08 K/UL — HIGH (ref 3.8–10.5)
WBC # FLD AUTO: 11.08 K/UL — HIGH (ref 3.8–10.5)

## 2025-08-19 PROCEDURE — 99232 SBSQ HOSP IP/OBS MODERATE 35: CPT

## 2025-08-19 RX ADMIN — GABAPENTIN 300 MILLIGRAM(S): 400 CAPSULE ORAL at 05:29

## 2025-08-19 RX ADMIN — Medication 975 MILLIGRAM(S): at 13:42

## 2025-08-19 RX ADMIN — POLYETHYLENE GLYCOL 3350 17 GRAM(S): 17 POWDER, FOR SOLUTION ORAL at 18:19

## 2025-08-19 RX ADMIN — OXYCODONE HYDROCHLORIDE 10 MILLIGRAM(S): 30 TABLET ORAL at 06:29

## 2025-08-19 RX ADMIN — OXYCODONE HYDROCHLORIDE 5 MILLIGRAM(S): 30 TABLET ORAL at 16:28

## 2025-08-19 RX ADMIN — Medication 975 MILLIGRAM(S): at 06:48

## 2025-08-19 RX ADMIN — GABAPENTIN 300 MILLIGRAM(S): 400 CAPSULE ORAL at 13:42

## 2025-08-19 RX ADMIN — OXYCODONE HYDROCHLORIDE 5 MILLIGRAM(S): 30 TABLET ORAL at 12:30

## 2025-08-19 RX ADMIN — OXYCODONE HYDROCHLORIDE 5 MILLIGRAM(S): 30 TABLET ORAL at 17:25

## 2025-08-19 RX ADMIN — Medication 975 MILLIGRAM(S): at 14:40

## 2025-08-19 RX ADMIN — ENOXAPARIN SODIUM 40 MILLIGRAM(S): 100 INJECTION SUBCUTANEOUS at 05:29

## 2025-08-19 RX ADMIN — OXYCODONE HYDROCHLORIDE 5 MILLIGRAM(S): 30 TABLET ORAL at 21:30

## 2025-08-19 RX ADMIN — OXYCODONE HYDROCHLORIDE 10 MILLIGRAM(S): 30 TABLET ORAL at 05:29

## 2025-08-19 RX ADMIN — OXYCODONE HYDROCHLORIDE 5 MILLIGRAM(S): 30 TABLET ORAL at 11:32

## 2025-08-19 RX ADMIN — Medication 975 MILLIGRAM(S): at 05:29

## 2025-08-19 RX ADMIN — Medication 2 TABLET(S): at 21:31

## 2025-08-19 RX ADMIN — Medication 975 MILLIGRAM(S): at 21:31

## 2025-08-19 RX ADMIN — GABAPENTIN 300 MILLIGRAM(S): 400 CAPSULE ORAL at 21:31

## 2025-08-20 PROCEDURE — 99232 SBSQ HOSP IP/OBS MODERATE 35: CPT

## 2025-08-20 RX ADMIN — Medication 975 MILLIGRAM(S): at 22:15

## 2025-08-20 RX ADMIN — GABAPENTIN 300 MILLIGRAM(S): 400 CAPSULE ORAL at 21:29

## 2025-08-20 RX ADMIN — OXYCODONE HYDROCHLORIDE 5 MILLIGRAM(S): 30 TABLET ORAL at 12:42

## 2025-08-20 RX ADMIN — OXYCODONE HYDROCHLORIDE 5 MILLIGRAM(S): 30 TABLET ORAL at 22:15

## 2025-08-20 RX ADMIN — ENOXAPARIN SODIUM 40 MILLIGRAM(S): 100 INJECTION SUBCUTANEOUS at 05:55

## 2025-08-20 RX ADMIN — Medication 975 MILLIGRAM(S): at 14:20

## 2025-08-20 RX ADMIN — Medication 975 MILLIGRAM(S): at 05:54

## 2025-08-20 RX ADMIN — GABAPENTIN 300 MILLIGRAM(S): 400 CAPSULE ORAL at 05:55

## 2025-08-20 RX ADMIN — Medication 975 MILLIGRAM(S): at 13:50

## 2025-08-20 RX ADMIN — Medication 975 MILLIGRAM(S): at 21:29

## 2025-08-20 RX ADMIN — OXYCODONE HYDROCHLORIDE 10 MILLIGRAM(S): 30 TABLET ORAL at 05:54

## 2025-08-20 RX ADMIN — POLYETHYLENE GLYCOL 3350 17 GRAM(S): 17 POWDER, FOR SOLUTION ORAL at 09:16

## 2025-08-20 RX ADMIN — Medication 250 MILLILITER(S): at 11:44

## 2025-08-20 RX ADMIN — OXYCODONE HYDROCHLORIDE 5 MILLIGRAM(S): 30 TABLET ORAL at 23:15

## 2025-08-20 RX ADMIN — OXYCODONE HYDROCHLORIDE 5 MILLIGRAM(S): 30 TABLET ORAL at 11:42

## 2025-08-20 RX ADMIN — GABAPENTIN 300 MILLIGRAM(S): 400 CAPSULE ORAL at 13:50

## 2025-08-21 PROCEDURE — 99232 SBSQ HOSP IP/OBS MODERATE 35: CPT

## 2025-08-21 RX ORDER — KETOROLAC TROMETHAMINE 30 MG/ML
10 INJECTION, SOLUTION INTRAMUSCULAR; INTRAVENOUS EVERY 6 HOURS
Refills: 0 | Status: DISCONTINUED | OUTPATIENT
Start: 2025-08-21 | End: 2025-08-22

## 2025-08-21 RX ORDER — KETOROLAC TROMETHAMINE 30 MG/ML
15 INJECTION, SOLUTION INTRAMUSCULAR; INTRAVENOUS ONCE
Refills: 0 | Status: DISCONTINUED | OUTPATIENT
Start: 2025-08-21 | End: 2025-08-21

## 2025-08-21 RX ADMIN — POLYETHYLENE GLYCOL 3350 17 GRAM(S): 17 POWDER, FOR SOLUTION ORAL at 11:30

## 2025-08-21 RX ADMIN — Medication 975 MILLIGRAM(S): at 21:21

## 2025-08-21 RX ADMIN — OXYCODONE HYDROCHLORIDE 5 MILLIGRAM(S): 30 TABLET ORAL at 13:34

## 2025-08-21 RX ADMIN — KETOROLAC TROMETHAMINE 15 MILLIGRAM(S): 30 INJECTION, SOLUTION INTRAMUSCULAR; INTRAVENOUS at 18:00

## 2025-08-21 RX ADMIN — OXYCODONE HYDROCHLORIDE 10 MILLIGRAM(S): 30 TABLET ORAL at 07:47

## 2025-08-21 RX ADMIN — Medication 975 MILLIGRAM(S): at 07:46

## 2025-08-21 RX ADMIN — GABAPENTIN 300 MILLIGRAM(S): 400 CAPSULE ORAL at 06:00

## 2025-08-21 RX ADMIN — Medication 975 MILLIGRAM(S): at 14:33

## 2025-08-21 RX ADMIN — Medication 975 MILLIGRAM(S): at 13:33

## 2025-08-21 RX ADMIN — ENOXAPARIN SODIUM 40 MILLIGRAM(S): 100 INJECTION SUBCUTANEOUS at 05:59

## 2025-08-21 RX ADMIN — OXYCODONE HYDROCHLORIDE 10 MILLIGRAM(S): 30 TABLET ORAL at 06:00

## 2025-08-21 RX ADMIN — OXYCODONE HYDROCHLORIDE 5 MILLIGRAM(S): 30 TABLET ORAL at 14:34

## 2025-08-21 RX ADMIN — Medication 975 MILLIGRAM(S): at 22:00

## 2025-08-21 RX ADMIN — GABAPENTIN 300 MILLIGRAM(S): 400 CAPSULE ORAL at 21:21

## 2025-08-21 RX ADMIN — Medication 975 MILLIGRAM(S): at 06:00

## 2025-08-22 ENCOUNTER — APPOINTMENT (OUTPATIENT)
Dept: ORTHOPEDIC SURGERY | Facility: CLINIC | Age: 70
End: 2025-08-22

## 2025-08-22 VITALS
SYSTOLIC BLOOD PRESSURE: 104 MMHG | TEMPERATURE: 98 F | OXYGEN SATURATION: 97 % | HEART RATE: 107 BPM | RESPIRATION RATE: 18 BRPM | DIASTOLIC BLOOD PRESSURE: 60 MMHG

## 2025-08-22 PROCEDURE — 85027 COMPLETE CBC AUTOMATED: CPT

## 2025-08-22 PROCEDURE — 85610 PROTHROMBIN TIME: CPT

## 2025-08-22 PROCEDURE — 97167 OT EVAL HIGH COMPLEX 60 MIN: CPT

## 2025-08-22 PROCEDURE — 97116 GAIT TRAINING THERAPY: CPT

## 2025-08-22 PROCEDURE — 97110 THERAPEUTIC EXERCISES: CPT

## 2025-08-22 PROCEDURE — 87641 MR-STAPH DNA AMP PROBE: CPT

## 2025-08-22 PROCEDURE — 86850 RBC ANTIBODY SCREEN: CPT

## 2025-08-22 PROCEDURE — 86900 BLOOD TYPING SEROLOGIC ABO: CPT

## 2025-08-22 PROCEDURE — 80053 COMPREHEN METABOLIC PANEL: CPT

## 2025-08-22 PROCEDURE — 99285 EMERGENCY DEPT VISIT HI MDM: CPT

## 2025-08-22 PROCEDURE — 80048 BASIC METABOLIC PNL TOTAL CA: CPT

## 2025-08-22 PROCEDURE — 73552 X-RAY EXAM OF FEMUR 2/>: CPT

## 2025-08-22 PROCEDURE — 76000 FLUOROSCOPY <1 HR PHYS/QHP: CPT

## 2025-08-22 PROCEDURE — 85730 THROMBOPLASTIN TIME PARTIAL: CPT

## 2025-08-22 PROCEDURE — 87640 STAPH A DNA AMP PROBE: CPT

## 2025-08-22 PROCEDURE — 97163 PT EVAL HIGH COMPLEX 45 MIN: CPT

## 2025-08-22 PROCEDURE — 86901 BLOOD TYPING SEROLOGIC RH(D): CPT

## 2025-08-22 PROCEDURE — 36415 COLL VENOUS BLD VENIPUNCTURE: CPT

## 2025-08-22 PROCEDURE — 83735 ASSAY OF MAGNESIUM: CPT

## 2025-08-22 PROCEDURE — 71045 X-RAY EXAM CHEST 1 VIEW: CPT

## 2025-08-22 PROCEDURE — C1713: CPT

## 2025-08-22 PROCEDURE — 84100 ASSAY OF PHOSPHORUS: CPT

## 2025-08-22 PROCEDURE — 96374 THER/PROPH/DIAG INJ IV PUSH: CPT

## 2025-08-22 PROCEDURE — 93005 ELECTROCARDIOGRAM TRACING: CPT

## 2025-08-22 PROCEDURE — 93970 EXTREMITY STUDY: CPT

## 2025-08-22 PROCEDURE — 96375 TX/PRO/DX INJ NEW DRUG ADDON: CPT

## 2025-08-22 RX ORDER — SENNA 187 MG
2 TABLET ORAL
Qty: 0 | Refills: 0 | DISCHARGE
Start: 2025-08-22

## 2025-08-22 RX ORDER — ACETAMINOPHEN 500 MG/5ML
3 LIQUID (ML) ORAL
Qty: 0 | Refills: 0 | DISCHARGE
Start: 2025-08-22

## 2025-08-22 RX ORDER — OXYCODONE HYDROCHLORIDE 30 MG/1
1 TABLET ORAL
Qty: 0 | Refills: 0 | DISCHARGE
Start: 2025-08-22

## 2025-08-22 RX ORDER — GABAPENTIN 400 MG/1
1 CAPSULE ORAL
Qty: 0 | Refills: 0 | DISCHARGE
Start: 2025-08-22

## 2025-08-22 RX ORDER — OXYCODONE HYDROCHLORIDE 30 MG/1
5 TABLET ORAL
Refills: 0 | Status: DISCONTINUED | OUTPATIENT
Start: 2025-08-22 | End: 2025-08-22

## 2025-08-22 RX ORDER — ENOXAPARIN SODIUM 100 MG/ML
40 INJECTION SUBCUTANEOUS
Qty: 21 | Refills: 0
Start: 2025-08-22 | End: 2025-09-11

## 2025-08-22 RX ORDER — OXYCODONE HYDROCHLORIDE 30 MG/1
10 TABLET ORAL
Refills: 0 | Status: DISCONTINUED | OUTPATIENT
Start: 2025-08-22 | End: 2025-08-22

## 2025-08-22 RX ADMIN — GABAPENTIN 300 MILLIGRAM(S): 400 CAPSULE ORAL at 05:49

## 2025-08-22 RX ADMIN — KETOROLAC TROMETHAMINE 10 MILLIGRAM(S): 30 INJECTION, SOLUTION INTRAMUSCULAR; INTRAVENOUS at 06:30

## 2025-08-22 RX ADMIN — ENOXAPARIN SODIUM 40 MILLIGRAM(S): 100 INJECTION SUBCUTANEOUS at 05:48

## 2025-08-22 RX ADMIN — KETOROLAC TROMETHAMINE 10 MILLIGRAM(S): 30 INJECTION, SOLUTION INTRAMUSCULAR; INTRAVENOUS at 05:49

## 2025-08-22 RX ADMIN — Medication 975 MILLIGRAM(S): at 05:48

## 2025-08-22 RX ADMIN — Medication 975 MILLIGRAM(S): at 06:30

## 2025-08-27 ENCOUNTER — APPOINTMENT (OUTPATIENT)
Dept: ORTHOPEDIC SURGERY | Facility: CLINIC | Age: 70
End: 2025-08-27

## 2025-09-03 ENCOUNTER — APPOINTMENT (OUTPATIENT)
Dept: ORTHOPEDIC SURGERY | Facility: CLINIC | Age: 70
End: 2025-09-03

## 2025-09-03 DIAGNOSIS — S72.142D DISPLACED INTERTROCHANTERIC FRACTURE OF LEFT FEMUR, SUBSEQUENT ENCOUNTER FOR CLOSED FRACTURE WITH ROUTINE HEALING: ICD-10-CM

## (undated) DEVICE — SUT MONOCRYL 3-0 27" PS-2 UNDYED

## (undated) DEVICE — SUT VICRYL PLUS 2-0 27" CT-2 UNDYED

## (undated) DEVICE — GLV 8 PROTEXIS (BLUE)

## (undated) DEVICE — DRAPE SPLIT SHEET 77" X 108"

## (undated) DEVICE — PACK EXTREMITY

## (undated) DEVICE — VENODYNE/SCD SLEEVE CALF MEDIUM

## (undated) DEVICE — SOL IRR BAG H2O 1000ML

## (undated) DEVICE — SUT VICRYL PLUS 0 27" CT-2 UNDYED

## (undated) DEVICE — DRAPE C ARM UNIVERSAL

## (undated) DEVICE — SOL IRR BAG NS 0.9% 1000ML

## (undated) DEVICE — DRAPE C ARM C-ARMOUR

## (undated) DEVICE — Device

## (undated) DEVICE — SUT MONOCRYL 2-0 27" SH UNDYED

## (undated) DEVICE — DRILL BIT SYNTHES ORTHO 4.2MM

## (undated) DEVICE — DRAPE TOWEL BLUE 17" X 24"

## (undated) DEVICE — DRAPE MAYO STAND 23"